# Patient Record
Sex: FEMALE | Race: WHITE
[De-identification: names, ages, dates, MRNs, and addresses within clinical notes are randomized per-mention and may not be internally consistent; named-entity substitution may affect disease eponyms.]

---

## 2018-02-22 ENCOUNTER — HOSPITAL ENCOUNTER (OUTPATIENT)
Dept: HOSPITAL 31 - C.ENDO | Age: 68
Discharge: HOME | End: 2018-02-22
Attending: SPECIALIST
Payer: MEDICARE

## 2018-02-22 VITALS — BODY MASS INDEX: 31.8 KG/M2

## 2018-02-22 VITALS — DIASTOLIC BLOOD PRESSURE: 68 MMHG | HEART RATE: 92 BPM | SYSTOLIC BLOOD PRESSURE: 112 MMHG | RESPIRATION RATE: 14 BRPM

## 2018-02-22 VITALS — OXYGEN SATURATION: 100 % | TEMPERATURE: 97.1 F

## 2018-02-22 DIAGNOSIS — R10.84: ICD-10-CM

## 2018-02-22 DIAGNOSIS — I10: ICD-10-CM

## 2018-02-22 DIAGNOSIS — K64.8: ICD-10-CM

## 2018-02-22 DIAGNOSIS — E11.9: ICD-10-CM

## 2018-02-22 DIAGNOSIS — K64.4: ICD-10-CM

## 2018-02-22 DIAGNOSIS — J45.909: ICD-10-CM

## 2018-02-22 DIAGNOSIS — K58.9: ICD-10-CM

## 2018-02-22 DIAGNOSIS — R19.4: ICD-10-CM

## 2018-02-22 DIAGNOSIS — K62.5: Primary | ICD-10-CM

## 2018-02-22 PROCEDURE — 82948 REAGENT STRIP/BLOOD GLUCOSE: CPT

## 2018-02-22 PROCEDURE — 45380 COLONOSCOPY AND BIOPSY: CPT

## 2018-02-22 PROCEDURE — 88305 TISSUE EXAM BY PATHOLOGIST: CPT

## 2018-02-22 NOTE — CP.SDSHP
[facility] Day [facility]





- History


Proposed Procedure: COLONSCOPY


Pre-Op Diagnosis: SEE NOTES





- Previous Medical/Surgical History


Cardiac: Hypertension


Pulmonary: Asthma


Endocrine/Metabolic: Thyroid Disease, Diabetes, Other


Misc: Other





- Allergies


Allergies: 


Allergies





cefazolin Allergy (Mild, Verified 02/21/18 13:47)


 ITCHING


ibuprofen Allergy (Mild, Verified 02/21/18 13:47)


 ITCHING


pineapple Allergy (Mild, Verified 02/21/18 13:47)


 URTICARIA


zolpidem [From Ambien] Allergy (Verified 02/22/18 07:03)


 DIZZINESS


'RELAXER MEDS" Allergy (Uncoded 02/22/18 07:03)


 ITCHING











- Physical Exam


General Appearance: N


Vital Signs: 


 Vital Signs











  02/22/18





  06:50


 


Temperature 97.6 F


 


Pulse Rate 80


 


Respiratory 20





Rate 


 


Blood Pressure 131/79


 


O2 Sat by Pulse 97





Oximetry 











Mental Status: Alert & Oriented x3


Neuro: WNL


Heart: Other


Lungs: Other


GI: Other





- {Optional Preform as Required}


Breast: WNL


Abdomen: Other


Rectal: Other


Integument: WNL


: WNL


Ortho: Other


ENT: WNL





- Impression


Pt. Evaluated Today:Candidate for Anesthesia & Procedure: Yes





- Date & Time


Time: 08:02





Short Stay Discharge





- Short Stay Discharge


Admitting Diagnosis/Reason for Visit: RECTAL BLEEDING


Disposition: HOME/ ROUTINE

## 2018-11-24 ENCOUNTER — HOSPITAL ENCOUNTER (INPATIENT)
Dept: HOSPITAL 31 - C.ER | Age: 68
LOS: 6 days | Discharge: HOME | DRG: 202 | End: 2018-11-30
Attending: INTERNAL MEDICINE | Admitting: INTERNAL MEDICINE
Payer: MEDICARE

## 2018-11-24 VITALS — BODY MASS INDEX: 31.8 KG/M2

## 2018-11-24 DIAGNOSIS — Z79.4: ICD-10-CM

## 2018-11-24 DIAGNOSIS — J15.9: ICD-10-CM

## 2018-11-24 DIAGNOSIS — Z79.890: ICD-10-CM

## 2018-11-24 DIAGNOSIS — G62.9: ICD-10-CM

## 2018-11-24 DIAGNOSIS — F41.9: ICD-10-CM

## 2018-11-24 DIAGNOSIS — Z87.891: ICD-10-CM

## 2018-11-24 DIAGNOSIS — E03.9: ICD-10-CM

## 2018-11-24 DIAGNOSIS — J44.1: ICD-10-CM

## 2018-11-24 DIAGNOSIS — Z85.3: ICD-10-CM

## 2018-11-24 DIAGNOSIS — Z92.21: ICD-10-CM

## 2018-11-24 DIAGNOSIS — J45.901: Primary | ICD-10-CM

## 2018-11-24 DIAGNOSIS — I10: ICD-10-CM

## 2018-11-24 DIAGNOSIS — J20.9: ICD-10-CM

## 2018-11-24 DIAGNOSIS — E11.65: ICD-10-CM

## 2018-11-24 DIAGNOSIS — J44.0: ICD-10-CM

## 2018-11-24 DIAGNOSIS — E78.5: ICD-10-CM

## 2018-11-24 LAB
ALBUMIN SERPL-MCNC: 4.7 G/DL (ref 3.5–5)
ALBUMIN/GLOB SERPL: 1.1 {RATIO} (ref 1–2.1)
ALT SERPL-CCNC: 35 U/L (ref 9–52)
ARTERIAL BLOOD GAS O2 SAT: 97.7 % (ref 95–98)
ARTERIAL BLOOD GAS PCO2: 22 MM/HG (ref 35–45)
ARTERIAL BLOOD GAS TCO2: 22.8 MMOL/L (ref 22–28)
ARTERIAL PATENCY WRIST A: (no result)
AST SERPL-CCNC: 52 U/L (ref 14–36)
BASOPHILS # BLD AUTO: 0.1 K/UL (ref 0–0.2)
BASOPHILS NFR BLD: 0.7 % (ref 0–2)
BNP SERPL-MCNC: 43 PG/ML (ref 0–900)
BUN SERPL-MCNC: 16 MG/DL (ref 7–17)
CALCIUM SERPL-MCNC: 9.8 MG/DL (ref 8.6–10.4)
EOSINOPHIL # BLD AUTO: 0.3 K/UL (ref 0–0.7)
EOSINOPHIL NFR BLD: 2.5 % (ref 0–4)
ERYTHROCYTE [DISTWIDTH] IN BLOOD BY AUTOMATED COUNT: 14.3 % (ref 11.5–14.5)
GFR NON-AFRICAN AMERICAN: > 60
HCO3 BLDA-SCNC: 26.9 MMOL/L (ref 21–28)
HGB BLD-MCNC: 12.2 G/DL (ref 11–16)
INHALED O2 CONCENTRATION: 30 %
LYMPHOCYTES # BLD AUTO: 4 K/UL (ref 1–4.3)
LYMPHOCYTES NFR BLD AUTO: 33.6 % (ref 20–40)
MCH RBC QN AUTO: 31.2 PG (ref 27–31)
MCHC RBC AUTO-ENTMCNC: 33.8 G/DL (ref 33–37)
MCV RBC AUTO: 92.2 FL (ref 81–99)
MONOCYTES # BLD: 0.8 K/UL (ref 0–0.8)
MONOCYTES NFR BLD: 6.7 % (ref 0–10)
NEUTROPHILS # BLD: 6.7 K/UL (ref 1.8–7)
NEUTROPHILS NFR BLD AUTO: 56.5 % (ref 50–75)
NRBC BLD AUTO-RTO: 0.1 % (ref 0–2)
PH BLDA: 7.61 [PH] (ref 7.35–7.45)
PLATELET # BLD: 227 K/UL (ref 130–400)
PMV BLD AUTO: 10.4 FL (ref 7.2–11.7)
PO2 BLDA: 125 MM/HG (ref 80–100)
RBC # BLD AUTO: 3.92 MIL/UL (ref 3.8–5.2)
WBC # BLD AUTO: 11.9 K/UL (ref 4.8–10.8)

## 2018-11-24 PROCEDURE — 5A09557 ASSISTANCE WITH RESPIRATORY VENTILATION, GREATER THAN 96 CONSECUTIVE HOURS, CONTINUOUS POSITIVE AIRWAY PRESSURE: ICD-10-PCS | Performed by: INTERNAL MEDICINE

## 2018-11-24 RX ADMIN — METHYLPREDNISOLONE SODIUM SUCCINATE SCH MG: 40 INJECTION, POWDER, FOR SOLUTION INTRAMUSCULAR; INTRAVENOUS at 20:19

## 2018-11-24 NOTE — C.PDOC
History Of Present Illness


68 year old female with PMHx of asthma presents to the ED complaining of 

shortness of breath for 3 days that got worse today. Pt notes her SOB feels like

her previous asthma exacerbations. No previous intubations for asthma per pt. Pt

was sent in by PMD for further eval of SOB. Reports she had a fever of 99.3 

three days ago. Denies any chest pain, leg edema, chills, sweats, nausea, 

vomiting, lightheadedness or any other complaints.  


Chief Complaint (Nursing): Shortness Of Breath


History Per: Patient


History/Exam Limitations: no limitations


Onset/Duration Of Symptoms: Days (3)


Current Symptoms Are (Timing): Still Present


Associated Symptoms: Fever.  denies: Chills, Sweating, Chest Pain, Light-

headedness





Past Medical History


Reviewed: Historical Data, Nursing Documentation, Vital Signs


Vital Signs: 





                                Last Vital Signs











Temp  99 F   18 16:50


 


Pulse  112 H  18 16:50


 


Resp  24   18 16:50


 


BP  117/79   18 16:50


 


Pulse Ox  97   18 16:50














- Medical History


PMH: Asthma, Diabetes, Hypercholesterolemia, Hypothyroidism


   Denies: Chronic Kidney Disease


Surgical History: Appendectomy, Cholecystectomy





- [facility] Procedures











LARYGNOSCOPY AND OTH TRACHEOSCOPY (10/22/13)


VENOUS CATHETERIZATION NEC (10/22/13)








Family History: States: No Known Family Hx





- Social History


Hx Tobacco Use: No


Hx Alcohol Use: No


Hx Substance Use: No





- Immunization History


Hx Tetanus Toxoid Vaccination: No


Hx Influenza Vaccination: Yes ()


Hx Pneumococcal Vaccination: No





Review Of Systems


Constitutional: Positive for: Fever.  Negative for: Chills, Sweats


Eyes: Negative for: Pain


ENT: Negative for: Ear Pain, Mouth Swelling


Cardiovascular: Negative for: Chest Pain, Light Headedness


Respiratory: Positive for: Shortness of Breath


Gastrointestinal: Negative for: Nausea, Vomiting, Abdominal Pain, Diarrhea, C

onstipation, Melena, Hematochezia


Genitourinary: Negative for: Dysuria, Frequency, Hematuria, Pelvic Pain, Rash


Musculoskeletal: Negative for: Neck Pain


Skin: Negative for: Rash


Neurological: Negative for: Weakness, Numbness





Physical Exam





- Physical Exam


Appears: Non-toxic


Skin: Warm, Dry


Head: Normacephalic


Eye(s): bilateral: Normal Inspection, PERRL, EOMI


Nose: Normal


Oral Mucosa: Moist


Neck: Supple


Chest: Symmetrical


Cardiovascular: Rhythm Regular


Respiratory: No Rales, No Rhonchi, Wheezing (b/l)


Gastrointestinal/Abdominal: Normal Exam, Soft, No Tenderness


Back: Normal Inspection, No CVA Tenderness


Extremity: Normal ROM, No Pedal Edema, No Swelling


Extremity: Bilateral: Atraumatic, Normal Color And Temperature, Normal ROM


Neurological/Psych: Oriented x3, Normal Speech, Normal Cognition





ED Course And Treatment





- Laboratory Results


Result Diagrams: 


                                 18 18:56





                                 18 18:56


O2 Sat by Pulse Oximetry: 97 (NC)


Pulse Ox Interpretation: Normal





Critical Care Time





- Critical Care Note


Total Time (in mins): 30


Documented critical care: time excludes all time spent performing seperately 

billable procedures.





Medical Decision Making


Medical Decision Makin year old F w/ hx of asthma p/w sob. Multiple admission but no previous 

admissions for asthma. No fever, chills or night sweats. Dry cough. Sent in by 

PMD for further evaluation of SOB. Duonebs given, will continue w/ Mg and 

additional Nebs. 





Orders: 


- CXR


- Bloodwork


- Influenze AB


- Nebulizer treatment 


- Solumedrol 125mg IVP 








Pt noted to be speaking in 2 word sentences, with diffuse wheezes and tremulous.

She denies any hx of ETOH withdrawal or daily ETOH use. Placed on BIPAP with 

improvement. 


ICU consulted 


appreciate consultation w/ Dr. Piedra (ICU) to see pt








Seen by Dr. Piedra: ?withdrawal per ICU. Reccomending CT PE study, expanded 

history per ICU: pt has hx of CA, previously on eliquis and opiates. 


UDS requested by ICU








CT PE negative


abg- nonhypercapnic. 


seen by ICU bedside: likely withdrawal per icu given ABG and clinical hx of CA 

previously on opiates, no indication for ICU admission at this time


Wheezes resolved. lungs CTA b/l. 


Pt improved in NAD, off bipap, tolerting nebs well with good o2 sat, agreeable 

to plan. 








Disposition





- Disposition


Disposition: HOSPITALIZED


Disposition Time: 23:18


Condition: FAIR





- Clinical Impression


Clinical Impression: 


 Asthma








- Scribe Statement


The provider has reviewed the documentation as recorded by the Scribalessandra Cortes








All medical record entries made by the Scribe were at my direction and 

personally dictated by me. I have reviewed the chart and agree that the record 

accurately reflects my personal performance of the history, physical exam, 

medical decision making, and the department course for this patient. I have also

personally directed, reviewed, and agree with the discharge instructions and 

disposition.

## 2018-11-25 LAB
APTT BLD: 26 SECONDS (ref 21–34)
INR PPP: 1.2
PROTHROMBIN TIME: 13 SECONDS (ref 9.7–12.2)

## 2018-11-25 RX ADMIN — METHYLPREDNISOLONE SODIUM SUCCINATE SCH MG: 40 INJECTION, POWDER, FOR SOLUTION INTRAMUSCULAR; INTRAVENOUS at 04:18

## 2018-11-25 RX ADMIN — FLUTICASONE FUROATE AND VILANTEROL TRIFENATATE SCH PUFF: 100; 25 POWDER RESPIRATORY (INHALATION) at 09:32

## 2018-11-25 RX ADMIN — Medication SCH CAP: at 19:32

## 2018-11-25 RX ADMIN — INSULIN ASPART SCH UNITS: 100 INJECTION, SOLUTION INTRAVENOUS; SUBCUTANEOUS at 08:09

## 2018-11-25 RX ADMIN — IPRATROPIUM BROMIDE AND ALBUTEROL SULFATE SCH ML: .5; 3 SOLUTION RESPIRATORY (INHALATION) at 09:46

## 2018-11-25 RX ADMIN — METHYLPREDNISOLONE SODIUM SUCCINATE SCH MG: 40 INJECTION, POWDER, FOR SOLUTION INTRAMUSCULAR; INTRAVENOUS at 19:32

## 2018-11-25 RX ADMIN — INSULIN ASPART SCH UNITS: 100 INJECTION, SOLUTION INTRAVENOUS; SUBCUTANEOUS at 21:30

## 2018-11-25 RX ADMIN — INSULIN DETEMIR SCH UNITS: 100 INJECTION, SOLUTION SUBCUTANEOUS at 21:30

## 2018-11-25 RX ADMIN — INSULIN ASPART SCH UNITS: 100 INJECTION, SOLUTION INTRAVENOUS; SUBCUTANEOUS at 17:56

## 2018-11-25 RX ADMIN — IPRATROPIUM BROMIDE AND ALBUTEROL SULFATE SCH ML: .5; 3 SOLUTION RESPIRATORY (INHALATION) at 15:03

## 2018-11-25 RX ADMIN — ENOXAPARIN SODIUM SCH MG: 40 INJECTION SUBCUTANEOUS at 10:03

## 2018-11-25 RX ADMIN — PANTOPRAZOLE SODIUM SCH MG: 40 TABLET, DELAYED RELEASE ORAL at 15:02

## 2018-11-25 RX ADMIN — IPRATROPIUM BROMIDE AND ALBUTEROL SULFATE SCH ML: .5; 3 SOLUTION RESPIRATORY (INHALATION) at 01:26

## 2018-11-25 RX ADMIN — INSULIN DETEMIR SCH UNITS: 100 INJECTION, SOLUTION SUBCUTANEOUS at 00:44

## 2018-11-25 RX ADMIN — INSULIN ASPART SCH UNITS: 100 INJECTION, SOLUTION INTRAVENOUS; SUBCUTANEOUS at 12:11

## 2018-11-25 RX ADMIN — IPRATROPIUM BROMIDE AND ALBUTEROL SULFATE SCH ML: .5; 3 SOLUTION RESPIRATORY (INHALATION) at 20:21

## 2018-11-25 RX ADMIN — METHYLPREDNISOLONE SODIUM SUCCINATE SCH MG: 40 INJECTION, POWDER, FOR SOLUTION INTRAMUSCULAR; INTRAVENOUS at 12:18

## 2018-11-25 NOTE — CP.PCM.CON
History of Present Illness





- History of Present Illness


History of Present Illness: 





CHART REVIEWED. PT SEEN AND EXAMINED. 


69 YO HISP FEMALE WITH A HX COPD, HTN, DM, HYPERLIPIDEMIA, HYPOTH, +BREAST CA 

S/P CHEMO AND RT ?METS TO THYROID AND KIDNEY, ADM 11/24/18 WITH INCREASED MOD-

SEVERE SOB AT REST X 3 DAYS, SEEN AT [facility] [place] AND RELEASED ON AB, COMPLETED 

COURSE, NO BETTER.,  NO RELIEF WITH HOME NEB 4X/DAY. +COUGH NO SPUTUM., NO HOME 

O2. IN ER, PLACED ON BIPAP SUPPORT. 





Review of Systems





- Review of Systems


All systems: reviewed and no additional remarkable complaints except





- Constitutional


Constitutional: Weakness.  absent: Chills, Night Sweats





- EENT


Eyes: absent: Change in Vision


Nose/Mouth/Throat: absent: Nasal Congestion





- Respiratory


Respiratory: Cough, Dyspnea, Dyspnea on Exertion, Wheezing.  absent: Excessive 

Mucous Production





- Gastrointestinal


Gastrointestinal: absent: Nausea, Vomiting





- Genitourinary


Genitourinary: absent: Dysuria





- Musculoskeletal


Musculoskeletal: absent: Joint Swelling, Tingling





- Integumentary


Integumentary: absent: Lesions





- Neurological


Neurological: absent: Abnormal Hearing





- Psychiatric


Psychiatric: absent: Confusion





- Endocrine


Endocrine: absent: Change in Body Appearance





- Hematologic/Lymphatic


Hematologic: absent: Easy Bleeding





Past Patient History





- Past Medical History & Family History


Past Medical History?: Yes


Past Family History: Reviewed and not pertinent





- Past Social History


Smoking Status: Former Smoker


Alcohol: None





- CARDIAC


Hx Cardiac Disorders: Yes


Hx Hypercholesterolemia: Yes





- PULMONARY


Hx Respiratory Disorders: Yes


Hx Asthma: Yes





- NEUROLOGICAL


Hx Neurological Disorder: No





- HEENT


Hx HEENT Problems: No





- RENAL


Hx Chronic Kidney Disease: No





- ENDOCRINE/METABOLIC


Hx Endocrine Disorders: Yes


Hx Diabetes Mellitus Type 2: Yes


Hx Hypothyroidism: Yes





- HEMATOLOGICAL/ONCOLOGICAL


Hx Blood Disorders: Yes


Hx Cancer: Yes (BREAST, KIDNEY, THYROID)


Hx Chemotherapy: Yes


Hx Metastesis: Yes





- INTEGUMENTARY


Hx Dermatological Problems: No





- MUSCULOSKELETAL/RHEUMATOLOGICAL


Hx Musculoskeletal Disorders: No


Hx Falls: No





- GASTROINTESTINAL


Hx Gastrointestinal Disorders: Yes


Hx Gall Bladder Disease: Yes


Other/Comment: C/O ABDOMINAL PAIN





- GENITOURINARY/GYNECOLOGICAL


Hx Genitourinary Disorders: No





- PSYCHIATRIC


Hx Bipolar Disorder: No


Hx Substance Use: No





- SURGICAL HISTORY


Hx Surgeries: Yes


Hx Appendectomy: Yes


Hx Cholecystectomy: Yes


Hx Hysterectomy: Yes


Hx Mastectomy: Yes





- ANESTHESIA


Hx Anesthesia: Yes


Hx Anesthesia Reactions: No


Hx Malignant Hyperthermia: No





Meds


Allergies/Adverse Reactions: 


                                    Allergies











Allergy/AdvReac Type Severity Reaction Status Date / Time


 


cefazolin Allergy Mild ITCHING Verified 11/24/18 16:54


 


ibuprofen Allergy Mild ITCHING Verified 11/24/18 16:54


 


pineapple Allergy Mild URTICARIA Verified 11/24/18 16:54


 


zolpidem [From Ambien] Allergy  DIZZINESS Verified 11/24/18 16:54


 


'RELAXER MEDS" Allergy  ITCHING Uncoded 11/24/18 16:54














- Medications


Medications: 


                               Current Medications





Albuterol/Ipratropium (Duoneb 3 Mg/0.5 Mg (3 Ml) Ud)  3 ml INH RQ6 [facility]


   Last Admin: 11/25/18 15:03 Dose:  3 ml


Alprazolam (Xanax)  1 mg PO DAILY PRN


   PRN Reason: Anxiety


Aspirin (Ecotrin)  81 mg PO DAILY [facility]


   Last Admin: 11/25/18 10:03 Dose:  81 mg


Cyanocobalamin (Vitamin B12 1000 Mcg Tab)  2,000 mcg PO DAILY [facility]


Dextrose (Dextrose 50% Inj)  0 ml IV STAT PRN; Protocol


   PRN Reason: Hypoglycemia Protocol


Dextrose (Glutose 15)  0 gm PO ONCE PRN; Protocol


   PRN Reason: Hypoglycemia Protocol


Enoxaparin Sodium (Lovenox)  40 mg SC DAILY [facility]


   Last Admin: 11/25/18 10:03 Dose:  40 mg


Ergocalciferol (Drisdol 50,000 Intl Units Cap)  1 cap PO QWK [facility]


Fenofibrate (Tricor)  48 mg PO DAILY [facility]


Fluticasone/Vilanterol (Breo Ellipta 100-25 Mcg Inh)  1 puff INH RQD [facility]


   Last Admin: 11/25/18 09:32 Dose:  1 puff


Gabapentin (Neurontin)  800 mg PO TID PRN


   PRN Reason: to give with dilaudid


   Last Admin: 11/25/18 13:35 Dose:  800 mg


Glucagon (Glucagen Diagnostic Kit)  0 mg IM STAT PRN; Protocol


   PRN Reason: Hypoglycemia Protocol


Hydromorphone HCl (Dilaudid)  2 mg PO Q8 PRN


   PRN Reason: pain


   Last Admin: 11/25/18 13:35 Dose:  2 mg


Azithromycin 500 mg/ Sodium (Chloride)  250 mls @ 250 mls/hr IVPB DAILY [facility]; 

Protocol


   Last Admin: 11/25/18 10:04 Dose:  250 mls/hr


Dextrose (Dextrose 5% In Water 1000 Ml)  1,000 mls @ 0 mls/hr IV .Q0M PRN; 

Protocol


   PRN Reason: Hypoglycemia Protocol


Insulin Aspart (Novolog)  0 unit SC ACHS [facility]; Protocol


   Last Admin: 11/25/18 17:56 Dose:  5 units


Insulin Detemir (Levemir)  20 unit SC [facility]


   Last Admin: 11/25/18 00:44 Dose:  20 units


Lactobacillus Acidophilus (Bacid Acidophilus)  1 cap PO BID [facility]


Levothyroxine Sodium (Synthroid)  100 mcg PO DAILY@0630 [facility]


Lidocaine (Lidoderm)  1 ea TD DAILY PRN


   PRN Reason: pain


Methylprednisolone (Solu-Medrol)  40 mg IVP Q8H [facility]


   Stop: 11/27/18 19:31


   Last Admin: 11/25/18 12:18 Dose:  40 mg


Montelukast Sodium (Singulair)  10 mg PO [facility]


   Last Admin: 11/24/18 21:54 Dose:  10 mg


Omega-3-Acid Ethyl Esters (Lovaza)  1 gm PO DAILY [facility]


Pantoprazole Sodium (Protonix Ec Tab)  40 mg PO DAILY [facility]


   Last Admin: 11/25/18 15:02 Dose:  40 mg


Pneumococcal Polyvalent Vaccine (Pneumovax 23 Vaccine)  0.5 ml IM .ONCE ONE


   Stop: 11/27/18 12:01


Repaglinide (Prandin)  1 mg PO TID [facility]


   Last Admin: 11/25/18 18:10 Dose:  1 mg


Rosuvastatin Calcium (Crestor)  20 mg PO [facility]


Sucralfate (Carafate Tab)  1 gm PO QID [facility]


   Last Admin: 11/25/18 17:56 Dose:  1 gm











Physical Exam





- Constitutional


Appears: No Acute Distress





- Head Exam


Head Exam: ATRAUMATIC, NORMOCEPHALIC





- Eye Exam


Eye Exam: EOMI, Normal appearance





- ENT Exam


ENT Exam: Mucous Membranes Moist





- Neck Exam


Neck exam: Positive for: Normal Inspection





- Respiratory Exam


Respiratory Exam: Decreased Breath Sounds.  absent: Accessory Muscle Use, 

Wheezes





- Cardiovascular Exam


Cardiovascular Exam: RRR, +S1, +S2





- GI/Abdominal Exam


GI & Abdominal Exam: Soft.  absent: Tenderness





- Rectal Exam


Rectal Exam: Deferred





- Extremities Exam


Extremities exam: Negative for: calf tenderness, pedal edema





- Back Exam


Back exam: absent: CVA tenderness (L), CVA tenderness (R)





- Neurological Exam


Neurological exam: Alert, CN II-XII Intact, Oriented x3





- Psychiatric Exam


Psychiatric exam: Normal Mood





Results





- Vital Signs


Recent Vital Signs: 


                                Last Vital Signs











Temp  98.2 F   11/25/18 17:23


 


Pulse  90   11/25/18 17:23


 


Resp  20   11/25/18 17:23


 


BP  120/78   11/25/18 17:23


 


Pulse Ox  96   11/25/18 17:23














- Labs


Result Diagrams: 


                                 11/24/18 18:56





                                 11/24/18 18:56


Labs: 


                         Laboratory Results - last 24 hr











  11/24/18 11/24/18 11/24/18





  18:18 18:56 19:00


 


PT   


 


INR   


 


APTT   


 


Puncture Site    Rra


 


pCO2    22 L


 


pO2    125 H


 


HCO3    26.9


 


ABG pH    7.61 H*


 


ABG Total CO2    22.8


 


ABG O2 Saturation    97.7


 


ABG Base Excess    2.4


 


Jenaro Test    Pos


 


ABG Potassium    2.8 L


 


A-a O2 Difference    61.0


 


Respiratory Index    0.5


 


Glucose    217 H


 


Lactate    3.6 H


 


FiO2    30.0


 


Inspiratory BiPAP    10


 


Expiratory BiPAP    5


 


Crit Value Called To    Dr asif


 


Crit Value Called By    [facility]


 


Crit Value Read Back    Y


 


Blood Gas Notified Time    1909


 


Sodium   140  143.0


 


Potassium   4.3 


 


Chloride   99  107.0


 


Carbon Dioxide   22 


 


Anion Gap   23 H 


 


BUN   16 


 


Creatinine   0.7 


 


Est GFR ( Amer)   > 60 


 


Est GFR (Non-Af Amer)   > 60 


 


POC Glucose (mg/dL)   


 


Random Glucose   155 H 


 


Calcium   9.8 


 


Total Bilirubin   0.8 


 


AST   52 H 


 


ALT   35 


 


Alkaline Phosphatase   77 


 


Troponin I   


 


NT-Pro-B Natriuret Pep   


 


Total Protein   8.9 H 


 


Albumin   4.7 


 


Globulin   4.2 H 


 


Albumin/Globulin Ratio   1.1 


 


Procalcitonin   


 


Arterial Blood Potassium    2.8 L


 


Urine Opiates Screen   


 


Urine Methadone Screen   


 


Ur Barbiturates Screen   


 


Ur Phencyclidine Scrn   


 


Ur Amphetamines Screen   


 


U Benzodiazepines Scrn   


 


U Oth Cocaine Metabols   


 


U Cannabinoids Screen   


 


Influenza Typ A,B (EIA)  Negative for flu a/b  














  11/24/18 11/24/18 11/24/18





  19:34 19:38 22:12


 


PT   


 


INR   


 


APTT   


 


Puncture Site   


 


pCO2   


 


pO2   


 


HCO3   


 


ABG pH   


 


ABG Total CO2   


 


ABG O2 Saturation   


 


ABG Base Excess   


 


Jenaro Test   


 


ABG Potassium   


 


A-a O2 Difference   


 


Respiratory Index   


 


Glucose   


 


Lactate   


 


FiO2   


 


Inspiratory BiPAP   


 


Expiratory BiPAP   


 


Crit Value Called To   


 


Crit Value Called By   


 


Crit Value Read Back   


 


Blood Gas Notified Time   


 


Sodium   


 


Potassium   


 


Chloride   


 


Carbon Dioxide   


 


Anion Gap   


 


BUN   


 


Creatinine   


 


Est GFR ( Amer)   


 


Est GFR (Non-Af Amer)   


 


POC Glucose (mg/dL)   


 


Random Glucose   


 


Calcium   


 


Total Bilirubin   


 


AST   


 


ALT   


 


Alkaline Phosphatase   


 


Troponin I   < 0.0120 


 


NT-Pro-B Natriuret Pep   43.0 


 


Total Protein   


 


Albumin   


 


Globulin   


 


Albumin/Globulin Ratio   


 


Procalcitonin    < 0.05 L


 


Arterial Blood Potassium   


 


Urine Opiates Screen  Negative  


 


Urine Methadone Screen  Negative  


 


Ur Barbiturates Screen  Negative  


 


Ur Phencyclidine Scrn  Negative  


 


Ur Amphetamines Screen  Negative  


 


U Benzodiazepines Scrn  Negative  


 


U Oth Cocaine Metabols  Negative  


 


U Cannabinoids Screen  Negative  


 


Influenza Typ A,B (EIA)   














  11/24/18 11/25/18





  23:11 07:26


 


PT   13.0 H


 


INR   1.2


 


APTT   26


 


Puncture Site  


 


pCO2  


 


pO2  


 


HCO3  


 


ABG pH  


 


ABG Total CO2  


 


ABG O2 Saturation  


 


ABG Base Excess  


 


Jenaro Test  


 


ABG Potassium  


 


A-a O2 Difference  


 


Respiratory Index  


 


Glucose  


 


Lactate  


 


FiO2  


 


Inspiratory BiPAP  


 


Expiratory BiPAP  


 


Crit Value Called To  


 


Crit Value Called By  


 


Crit Value Read Back  


 


Blood Gas Notified Time  


 


Sodium  


 


Potassium  


 


Chloride  


 


Carbon Dioxide  


 


Anion Gap  


 


BUN  


 


Creatinine  


 


Est GFR ( Amer)  


 


Est GFR (Non-Af Amer)  


 


POC Glucose (mg/dL)  361 H 


 


Random Glucose  


 


Calcium  


 


Total Bilirubin  


 


AST  


 


ALT  


 


Alkaline Phosphatase  


 


Troponin I  


 


NT-Pro-B Natriuret Pep  


 


Total Protein  


 


Albumin  


 


Globulin  


 


Albumin/Globulin Ratio  


 


Procalcitonin  


 


Arterial Blood Potassium  


 


Urine Opiates Screen  


 


Urine Methadone Screen  


 


Ur Barbiturates Screen  


 


Ur Phencyclidine Scrn  


 


Ur Amphetamines Screen  


 


U Benzodiazepines Scrn  


 


U Oth Cocaine Metabols  


 


U Cannabinoids Screen  


 


Influenza Typ A,B (EIA)  














Assessment & Plan


(1) COPD exacerbation


Status: Acute   





(2) Diabetes


Status: Acute   





(3) Breast cancer


Status: Acute   





(4) Hyperlipidemia


Status: Acute   





(5) Hypothyroid


Status: Acute   





(6) HTN (hypertension)


Status: Acute   





- Assessment and Plan (Free Text)


Assessment: 





69 YO FEMALE WITH A HX MULT MED PROBS ADM WITH COPD EXAC +SEVERE BRONCHITIS., CX

R REVIEWED. CTA REVIEWED, NEG PE. CONT NEB BD., MONITOR O2 SAT. ABG NOTED. 

IMPROVING ON HUMIDIFIED AIR NOW. STEROID TAPER AS TOLERATED. CARDIO W/U IN 

PROGRESS. ?UNDERLYING METASTATIC CA. GI/DVT PROPHYLAXIS. PROG GUARDED. DISCUSSED

WITH STAFF AT LENGTH AND FAMILY AT BEDSIDE.

## 2018-11-25 NOTE — CP.PCM.HP
Present on Admission





- Present on Admission


Any Indicators Present on Admission: No





Past Patient History





- Past Medical History & Family History


Past Medical History?: Yes





- Past Social History


Smoking Status: Former Smoker





- CARDIAC


Hx Cardiac Disorders: Yes


Hx Hypercholesterolemia: Yes





- PULMONARY


Hx Respiratory Disorders: Yes


Hx Asthma: Yes





- NEUROLOGICAL


Hx Neurological Disorder: No





- HEENT


Hx HEENT Problems: No





- RENAL


Hx Chronic Kidney Disease: No





- ENDOCRINE/METABOLIC


Hx Endocrine Disorders: Yes


Hx Hypothyroidism: Yes





- HEMATOLOGICAL/ONCOLOGICAL


Hx Blood Disorders: Yes


Hx Cancer: Yes (BREAST, KIDNEY, THYROID)


Hx Chemotherapy: Yes


Hx Metastesis: Yes





- INTEGUMENTARY


Hx Dermatological Problems: No





- MUSCULOSKELETAL/RHEUMATOLOGICAL


Hx Musculoskeletal Disorders: No


Hx Falls: No





- GASTROINTESTINAL


Hx Gastrointestinal Disorders: Yes


Other/Comment: C/O ABDOMINAL PAIN





- GENITOURINARY/GYNECOLOGICAL


Hx Genitourinary Disorders: No





- PSYCHIATRIC


Hx Bipolar Disorder: No


Hx Substance Use: No





- SURGICAL HISTORY


Hx Surgeries: Yes


Hx Appendectomy: Yes


Hx Cholecystectomy: Yes





- ANESTHESIA


Hx Anesthesia: Yes


Hx Anesthesia Reactions: No


Hx Malignant Hyperthermia: No





Meds


Allergies/Adverse Reactions: 


                                    Allergies











Allergy/AdvReac Type Severity Reaction Status Date / Time


 


cefazolin Allergy Mild ITCHING Verified 11/24/18 16:54


 


ibuprofen Allergy Mild ITCHING Verified 11/24/18 16:54


 


pineapple Allergy Mild URTICARIA Verified 11/24/18 16:54


 


zolpidem [From Ambien] Allergy  DIZZINESS Verified 11/24/18 16:54


 


'RELAXER MEDS" Allergy  ITCHING Uncoded 11/24/18 16:54














Physical Exam





- Constitutional


Appears: Well





- Head Exam


Head Exam: ATRAUMATIC, NORMAL INSPECTION, NORMOCEPHALIC





- Eye Exam


Eye Exam: EOMI, Normal appearance, PERRL


Pupil Exam: NORMAL ACCOMODATION, PERRL





- ENT Exam


ENT Exam: Mucous Membranes Moist, Normal Exam





- Neck Exam


Neck exam: Positive for: Normal Inspection





- Respiratory Exam


Respiratory Exam: Decreased Breath Sounds





- Cardiovascular Exam


Cardiovascular Exam: REGULAR RHYTHM, +S1, +S2





- GI/Abdominal Exam


GI & Abdominal Exam: Diminished Bowel Sounds, Soft





- Rectal Exam


Rectal Exam: Deferred





Results





- Vital Signs


Recent Vital Signs: 





                                Last Vital Signs











Temp  98.2 F   11/25/18 17:23


 


Pulse  90   11/25/18 17:23


 


Resp  20   11/25/18 17:23


 


BP  120/78   11/25/18 17:23


 


Pulse Ox  96   11/25/18 17:23














- Labs


Result Diagrams: 


                                 11/29/18 06:44





                                 11/29/18 06:44


Labs: 





                         Laboratory Results - last 24 hr











  11/24/18 11/24/18 11/24/18





  18:18 18:56 18:56


 


WBC   11.9 H 


 


RBC   3.92 


 


Hgb   12.2 


 


Hct   36.2 


 


MCV   92.2 


 


MCH   31.2 H 


 


MCHC   33.8 


 


RDW   14.3 


 


Plt Count   227 


 


MPV   10.4 


 


Neut % (Auto)   56.5 


 


Lymph % (Auto)   33.6 


 


Mono % (Auto)   6.7 


 


Eos % (Auto)   2.5 


 


Baso % (Auto)   0.7 


 


Neut # (Auto)   6.7 


 


Lymph # (Auto)   4.0 


 


Mono # (Auto)   0.8 


 


Eos # (Auto)   0.3 


 


Baso # (Auto)   0.1 


 


PT   


 


INR   


 


APTT   


 


Puncture Site   


 


pCO2   


 


pO2   


 


HCO3   


 


ABG pH   


 


ABG Total CO2   


 


ABG O2 Saturation   


 


ABG Base Excess   


 


Jenaro Test   


 


ABG Potassium   


 


A-a O2 Difference   


 


Respiratory Index   


 


Glucose   


 


Lactate   


 


FiO2   


 


Inspiratory BiPAP   


 


Expiratory BiPAP   


 


Crit Value Called To   


 


Crit Value Called By   


 


Crit Value Read Back   


 


Blood Gas Notified Time   


 


Sodium    140


 


Potassium    4.3


 


Chloride    99


 


Carbon Dioxide    22


 


Anion Gap    23 H


 


BUN    16


 


Creatinine    0.7


 


Est GFR ( Amer)    > 60


 


Est GFR (Non-Af Amer)    > 60


 


POC Glucose (mg/dL)   


 


Random Glucose    155 H


 


Calcium    9.8


 


Total Bilirubin    0.8


 


AST    52 H


 


ALT    35


 


Alkaline Phosphatase    77


 


Troponin I   


 


NT-Pro-B Natriuret Pep   


 


Total Protein    8.9 H


 


Albumin    4.7


 


Globulin    4.2 H


 


Albumin/Globulin Ratio    1.1


 


Procalcitonin   


 


Arterial Blood Potassium   


 


Urine Opiates Screen   


 


Urine Methadone Screen   


 


Ur Barbiturates Screen   


 


Ur Phencyclidine Scrn   


 


Ur Amphetamines Screen   


 


U Benzodiazepines Scrn   


 


U Oth Cocaine Metabols   


 


U Cannabinoids Screen   


 


Influenza Typ A,B (EIA)  Negative for flu a/b  














  11/24/18 11/24/18 11/24/18





  19:00 19:34 19:38


 


WBC   


 


RBC   


 


Hgb   


 


Hct   


 


MCV   


 


MCH   


 


MCHC   


 


RDW   


 


Plt Count   


 


MPV   


 


Neut % (Auto)   


 


Lymph % (Auto)   


 


Mono % (Auto)   


 


Eos % (Auto)   


 


Baso % (Auto)   


 


Neut # (Auto)   


 


Lymph # (Auto)   


 


Mono # (Auto)   


 


Eos # (Auto)   


 


Baso # (Auto)   


 


PT   


 


INR   


 


APTT   


 


Puncture Site  Rra  


 


pCO2  22 L  


 


pO2  125 H  


 


HCO3  26.9  


 


ABG pH  7.61 H*  


 


ABG Total CO2  22.8  


 


ABG O2 Saturation  97.7  


 


ABG Base Excess  2.4  


 


Jenaro Test  Pos  


 


ABG Potassium  2.8 L  


 


A-a O2 Difference  61.0  


 


Respiratory Index  0.5  


 


Glucose  217 H  


 


Lactate  3.6 H  


 


FiO2  30.0  


 


Inspiratory BiPAP  10  


 


Expiratory BiPAP  5  


 


Crit Value Called To  Dr asif  


 


Crit Value Called By  [facility]  


 


Crit Value Read Back  Y  


 


Blood Gas Notified Time  1909  


 


Sodium  143.0  


 


Potassium   


 


Chloride  107.0  


 


Carbon Dioxide   


 


Anion Gap   


 


BUN   


 


Creatinine   


 


Est GFR ( Amer)   


 


Est GFR (Non-Af Amer)   


 


POC Glucose (mg/dL)   


 


Random Glucose   


 


Calcium   


 


Total Bilirubin   


 


AST   


 


ALT   


 


Alkaline Phosphatase   


 


Troponin I    < 0.0120


 


NT-Pro-B Natriuret Pep    43.0


 


Total Protein   


 


Albumin   


 


Globulin   


 


Albumin/Globulin Ratio   


 


Procalcitonin   


 


Arterial Blood Potassium  2.8 L  


 


Urine Opiates Screen   Negative 


 


Urine Methadone Screen   Negative 


 


Ur Barbiturates Screen   Negative 


 


Ur Phencyclidine Scrn   Negative 


 


Ur Amphetamines Screen   Negative 


 


U Benzodiazepines Scrn   Negative 


 


U Oth Cocaine Metabols   Negative 


 


U Cannabinoids Screen   Negative 


 


Influenza Typ A,B (EIA)   














  11/24/18 11/24/18 11/25/18





  22:12 23:11 07:26


 


WBC   


 


RBC   


 


Hgb   


 


Hct   


 


MCV   


 


MCH   


 


MCHC   


 


RDW   


 


Plt Count   


 


MPV   


 


Neut % (Auto)   


 


Lymph % (Auto)   


 


Mono % (Auto)   


 


Eos % (Auto)   


 


Baso % (Auto)   


 


Neut # (Auto)   


 


Lymph # (Auto)   


 


Mono # (Auto)   


 


Eos # (Auto)   


 


Baso # (Auto)   


 


PT    13.0 H


 


INR    1.2


 


APTT    26


 


Puncture Site   


 


pCO2   


 


pO2   


 


HCO3   


 


ABG pH   


 


ABG Total CO2   


 


ABG O2 Saturation   


 


ABG Base Excess   


 


Jenaro Test   


 


ABG Potassium   


 


A-a O2 Difference   


 


Respiratory Index   


 


Glucose   


 


Lactate   


 


FiO2   


 


Inspiratory BiPAP   


 


Expiratory BiPAP   


 


Crit Value Called To   


 


Crit Value Called By   


 


Crit Value Read Back   


 


Blood Gas Notified Time   


 


Sodium   


 


Potassium   


 


Chloride   


 


Carbon Dioxide   


 


Anion Gap   


 


BUN   


 


Creatinine   


 


Est GFR ( Amer)   


 


Est GFR (Non-Af Amer)   


 


POC Glucose (mg/dL)   361 H 


 


Random Glucose   


 


Calcium   


 


Total Bilirubin   


 


AST   


 


ALT   


 


Alkaline Phosphatase   


 


Troponin I   


 


NT-Pro-B Natriuret Pep   


 


Total Protein   


 


Albumin   


 


Globulin   


 


Albumin/Globulin Ratio   


 


Procalcitonin  < 0.05 L  


 


Arterial Blood Potassium   


 


Urine Opiates Screen   


 


Urine Methadone Screen   


 


Ur Barbiturates Screen   


 


Ur Phencyclidine Scrn   


 


Ur Amphetamines Screen   


 


U Benzodiazepines Scrn   


 


U Oth Cocaine Metabols   


 


U Cannabinoids Screen   


 


Influenza Typ A,B (EIA)   














Assessment & Plan





- Assessment and Plan (Free Text)


Plan: 





Still having a lot of cough patient is on IV Avelox


IV steroid


DuoNeb


Singulair


Alprazolam


Pain medication


All medication as advised


CBC CMP


Follow-up with PMD

## 2018-11-25 NOTE — CT
Date of service: 



11/24/2018



PROCEDURE:  CT Chest with contrast (Pulmonary Angiogram)



HISTORY:

hx of ca p/w sob



COMPARISON:

10/22/2013



TECHNIQUE:

Axial computed tomography images were obtained of the chest in the 

pulmonary arterial phase of enhancement. Coronal and sagittal 

reformatted images were created and reviewed.



Intravenous contrast dose: 100 mL Omnipaque 350



Radiation dose:



Total exam DLP = 453.9 mGy-cm.



This CT exam was performed using one or more of the following dose 

reduction techniques: Automated exposure control, adjustment of the 

mA and/or kV according to patient size, and/or use of iterative 

reconstruction technique.



FINDINGS:



PULMONARY ARTERIES:

Evaluation technically limited due to timing of scan relative to 

contrast bolus.  Suboptimal evaluation of subsegmental pulmonary 

artery branches.  No evidence of pulmonary embolism in the main, 

lobar and segmental branches. 



AORTA:

No acute findings. No thoracic aortic aneurysm. No aortic 

atherosclerotic calcification or mural plaque present.



LUNGS:

Bilateral lower lobe linear scar/atelectasis.  No pulmonary 

infiltrate. 



PLEURAL SPACES:

Unremarkable. No effusion or pneumothorax. 



HEART:

Unremarkable. No cardiomegaly. No significant pericardial effusion. 



LYMPH NODES:

No lymphadenopathy.



BONES, CHEST WALL:

Right breast augmentation prosthesis.  Left mastectomy.  Surgical 

clips in left axilla.  No osseous fracture.



OTHER FINDINGS:

Unremarkable. 



IMPRESSION:

No evidence of pulmonary embolism.  Limited evaluation as above.  No 

pulmonary infiltrate.  Left mastectomy.  Right breast augmentation 

prosthesis. 



The preliminary findings for this examination were reported by [facility] at 11:05 p.m. on 11/24/2018..  There is concurrence of this 

report with the preliminary findings.

## 2018-11-25 NOTE — CP.PCM.CON
History of Present Illness





- History of Present Illness


History of Present Illness: 





I was asked to evaluate patient by Dr EDILBERTO Piedra  Patient seen 11/25/18 8908





Patient is a 68 year old female with HTN hyperchoelsterolemia DM, who presents 

with dyspnea. The symptoms have been present for the last 3 days.  She had 

associated cough.  Given DM there was a concern for a cardiac cause of her 

symptoms.





Review of Systems





- Constitutional


Constitutional: absent: As Per HPI, Anorexia, Chills, Daytime Sleepiness, 

Excessive Sweating, Fatigue, Fever, Frequent Falls, Headache, Increased 

Appetite, Lethargy, Malaise, Night Sweats, Snoring, Sleep Apnea, Weight Gain, 

Weight Loss, Weakness, Other





- EENT


Eyes: absent: As Per HPI, Blind Spots, Blurred Vision, Change in Vision, 

Decreased Night Vision, Diplopia, Discharge, Dry Eye, Exophthalmos, Floaters, 

Irritation, Itchy Eyes, Loss of Peripheral Vision, Pain, Photophobia, Requires 

Corrective Lenses, Sees Flashes, Spots in Vision, Tunnel Vision, Other Visual 

Disturbances, Loss of Vision, Other


Ears: absent: As Per HPI, Decreased Hearing, Ear Discharge, Ear Pain, Tinnitus, 

Abnormal Hearing, Disequilibrium, Dizziness, Other


Nose/Mouth/Throat: absent: As Per HPI, Epistaxis, Nasal Congestion, Nasal 

Discharge, Nasal Obstruction, Nasal Trauma, Nose Pain, Post Nasal Drip, Sinus 

Pain, Sinus Pressure, Bleeding Gums, Change in Voice, Dental Pain, Dry Mouth, 

Dysphagia, Halitosis, Hoarsness, Lip Swelling, Mouth Lesions, Mouth Pain, 

Odynophagia, Sore Throat, Throat Swelling, Tongue Swelling, Facial Pain, Neck 

Pain, Neck Mass, Other





- Cardiovascular


Cardiovascular: absent: As Per HPI, Acrocyanosis, Chest Pain, Chest Pain at 

Rest, Chest Pain with Activity, Claudication, Diaphoresis, Dyspnea, Dyspnea on 

Exertion, Edema, Irregular Heart Rhythm, Pain Radiating to Arm/Neck/Jaw, Leg Landon

ma, Leg Ulcers, Lightheadedness, Orthopnea, Palpitations, Paroxysmal Nocturnal 

Dyspnea, Pedal Edema, Radiating Pain, Rapid Heart Rate, Slow Heart Rate, 

Syncope, Other





- Respiratory


Respiratory: Cough, Dyspnea





- Gastrointestinal


Gastrointestinal: absent: As Per HPI, Abdominal Pain, Belching, Bloating, Change

in Bowel Habits, Change in Stool Character, Coffee Ground Emesis, Constipation, 

Cramping, Diarrhea, Dyspepsia, Dysphagia, Early Satiety, Excessive Flatus, Fecal

Incontinence, Heartburn, Hematemesis, Hematochezia, Loose Stools, Melena, 

Nausea, Odynophagia, Temesmus, Vomiting, Other





- Genitourinary


Genitourinary: absent: As Per HPI, Change in Urinary Stream, Difficulty Urin

ating, Dysuria, Flank Pain, Hematuria, Pyuria, Nocturia, Urinary Incontinence, 

Urinary Frequency, Urinary Hesitance, Urinary Urgency, Voiding Freq/Small Amts, 

Freq UTI, Hx Renal/Bladder Calculi, Hx /Renal Surgery, Bladder Distension, 

Other





- Musculoskeletal


Musculoskeletal: absent: As Per HPI, Abnormal Gait, Arthralgias, Atrophy, Back 

Pain, Deformity, Joint Swelling, Limited Range of Motion, Loss of Height, Muscle

Cramps, Muscle Weakness, Myalgias, Neck Pain, Numbness, Radiating Pain into 

Limb, Stiffness, Tingling, Other





- Integumentary


Integumentary: absent: As Per HPI, Acne, Alopecia, Bleeding Lesions, Change in 

Hair, Change in Nails, Change in Pigmentation, Changing Lesions, Dry Skin, 

Erythema, Furuncle, Hirsutism, Lesions, New Lesions, Non-Healing Lesions, 

Photosensitivity, Pruritus, Rash, Skin Pain, Skin Ulcer, Sores, Striae, 

Swelling, Unusual Bruising, Wounds, Jaundice, Other





- Neurological


Neurological: absent: As Per HPI, Abnormal Gait, Abnormal Hearing, Abnormal 

Movements, Abnormal Speech, Behavioral Changes, Burning Sensations, Confusion, 

Convulsions, Disequilibrium, Dizziness, Numbness, Focal Weakness, Frequent 

Falls, Headaches, Lack of Coordination, Loss of Vision, Memory Loss, 

Paresthesias, Radicular Pain, Restless Legs, Sensory Deficit, Syncope, Tingling,

Tremor, Vertigo, Weakness, Other Visual Disturbances, Other





- Psychiatric


Psychiatric: absent: As Per HPI, Abnormal Sleep Pattern, Anhedonia, Anxiety, A

uditory Hallucinations, Behavioral Changes, Change in Appetite, Change in 

Libido, Confusion, Depression, Difficulty Concentrating, Hallucinations, 

Homicidal Ideation, Hopelessness, Irritability, Memory Loss, Mood Swings, Panic 

Attacks, Paranoia, Suicidal Ideation, Visual Hallucinations, Tactile Isaacs

ucinations, Other





- Endocrine


Endocrine: absent: As Per HPI, Change in Body Appearance, Change in Libido, Cold

Intolorance, Deepening of Voice, Excessive Sweating, Fatigue, Flushing, Heat 

Intolorance, Increase in Ring/Shoe/Hat Size, Palpitations, Polydipsia, Po

lyphagia, Polyuria, Other





- Hematologic/Lymphatic


Hematologic: absent: As Per HPI, Easy Bleeding, Easy Bruising, Lymphadenopathy, 

Other





Past Patient History





- Past Medical History & Family History


Past Medical History?: Yes





- Past Social History


Smoking Status: Never Smoked





- CARDIAC


Hx Hypercholesterolemia: Yes





- PULMONARY


Hx Asthma: Yes





- NEUROLOGICAL


Hx Neurological Disorder: No





- HEENT


Hx HEENT Problems: No





- RENAL


Hx Chronic Kidney Disease: No





- ENDOCRINE/METABOLIC


Hx Hypothyroidism: Yes





- HEMATOLOGICAL/ONCOLOGICAL


Hx Blood Disorders: Yes


Hx Cancer: Yes (BREAST, KIDNEY, THYROID)


Hx Chemotherapy: Yes


Hx Metastesis: Yes





- INTEGUMENTARY


Hx Dermatological Problems: No





- MUSCULOSKELETAL/RHEUMATOLOGICAL


Hx Musculoskeletal Disorders: No





- GASTROINTESTINAL


Hx Gastrointestinal Disorders: Yes


Other/Comment: C/O ABDOMINAL PAIN





- GENITOURINARY/GYNECOLOGICAL


Hx Genitourinary Disorders: No





- PSYCHIATRIC


Hx Substance Use: No





- SURGICAL HISTORY


Hx Appendectomy: Yes


Hx Cholecystectomy: Yes





- ANESTHESIA


Hx Anesthesia: Yes


Hx Anesthesia Reactions: No


Hx Malignant Hyperthermia: No





Meds


Allergies/Adverse Reactions: 


                                    Allergies











Allergy/AdvReac Type Severity Reaction Status Date / Time


 


cefazolin Allergy Mild ITCHING Verified 11/24/18 16:54


 


ibuprofen Allergy Mild ITCHING Verified 11/24/18 16:54


 


pineapple Allergy Mild URTICARIA Verified 11/24/18 16:54


 


zolpidem [From Ambien] Allergy  DIZZINESS Verified 11/24/18 16:54


 


'RELAXER MEDS" Allergy  ITCHING Uncoded 11/24/18 16:54














- Medications


Medications: 


                               Current Medications





Albuterol/Ipratropium (Duoneb 3 Mg/0.5 Mg (3 Ml) Ud)  3 ml INH RQ6 Pending sale to [facility]


   Last Admin: 11/25/18 09:46 Dose:  3 ml


Alprazolam (Xanax)  1 mg PO DAILY PRN


   PRN Reason: Anxiety


Aspirin (Ecotrin)  81 mg PO DAILY Pending sale to [facility]


   Last Admin: 11/25/18 10:03 Dose:  81 mg


Dextrose (Dextrose 50% Inj)  0 ml IV STAT PRN; Protocol


   PRN Reason: Hypoglycemia Protocol


Dextrose (Glutose 15)  0 gm PO ONCE PRN; Protocol


   PRN Reason: Hypoglycemia Protocol


Enoxaparin Sodium (Lovenox)  40 mg SC DAILY Pending sale to [facility]


   Last Admin: 11/25/18 10:03 Dose:  40 mg


Fluticasone/Vilanterol (Breo Ellipta 100-25 Mcg Inh)  1 puff INH RQD Pending sale to [facility]


   Last Admin: 11/25/18 09:32 Dose:  1 puff


Gabapentin (Neurontin)  800 mg PO TID PRN


   PRN Reason: to give with dilaudid


   Last Admin: 11/25/18 05:07 Dose:  800 mg


Glucagon (Glucagen Diagnostic Kit)  0 mg IM STAT PRN; Protocol


   PRN Reason: Hypoglycemia Protocol


Home Med (Cholecalciferol (Vitamin D3) [Vitamin D3])  2,000 unit PO DAILY Pending sale to [facility]


Home Med (Cyanocobalamin (Vitamin B-12) [Vitamin B12])  2,500 mcg PO DAILY Pending sale to [facility]


Home Med (Fenofibrate [Fenofibrate])  40 mg PO DAILY Pending sale to [facility]


Home Med (L.Acidoph,Paracasei, B.Lactis [Probiotic])  1 each PO DAILY Pending sale to [facility]


Home Med (Lobaza)  1 gr PO DAILY Pending sale to [facility]


Home Med (Magnesium Amino Acid Chelate [Magnesium])  27 mg PO DAILY Pending sale to [facility]


Hydromorphone HCl (Dilaudid)  2 mg PO Q8 PRN


   PRN Reason: pain


   Last Admin: 11/25/18 05:07 Dose:  2 mg


Azithromycin 500 mg/ Sodium (Chloride)  250 mls @ 250 mls/hr IVPB DAILY Pending sale to [facility]; 

Protocol


   Last Admin: 11/25/18 10:04 Dose:  250 mls/hr


Dextrose (Dextrose 5% In Water 1000 Ml)  1,000 mls @ 0 mls/hr IV .Q0M PRN; 

Protocol


   PRN Reason: Hypoglycemia Protocol


Insulin Aspart (Novolog)  0 unit SC ACHS Pending sale to [facility]; Protocol


   Last Admin: 11/25/18 12:11 Dose:  5 units


Insulin Detemir (Levemir)  20 unit SC HS Pending sale to [facility]


   Last Admin: 11/25/18 00:44 Dose:  20 units


Levothyroxine Sodium (Synthroid)  100 mcg PO DAILY Pending sale to [facility]


   Last Admin: 11/25/18 10:04 Dose:  100 mcg


Lidocaine (Lidoderm)  1 ea TD DAILY PRN


   PRN Reason: pain


Methylprednisolone (Solu-Medrol)  40 mg IVP Q8H Pending sale to [facility]


   Stop: 11/27/18 19:31


   Last Admin: 11/25/18 12:18 Dose:  40 mg


Montelukast Sodium (Singulair)  10 mg PO HS Pending sale to [facility]


   Last Admin: 11/24/18 21:54 Dose:  10 mg


Pantoprazole Sodium (Protonix Ec Tab)  40 mg PO DAILY Pending sale to [facility]


Repaglinide (Prandin)  1 mg PO TID Pending sale to [facility]


   Last Admin: 11/25/18 10:04 Dose:  1 mg


Rosuvastatin Calcium (Crestor)  20 mg PO HS Pending sale to [facility]


Sucralfate (Carafate Tab)  1 gm PO QID Pending sale to [facility]


   Last Admin: 11/25/18 10:03 Dose:  1 gm











Physical Exam





- Constitutional


Appears: Non-toxic





- Head Exam


Head Exam: NORMAL INSPECTION





- Eye Exam


Eye Exam: Normal appearance





- ENT Exam


ENT Exam: Mucous Membranes Moist





- Neck Exam


Neck exam: Positive for: Full Rom





- Respiratory Exam


Respiratory Exam: NORMAL BREATHING PATTERN





- Cardiovascular Exam


Cardiovascular Exam: REGULAR RHYTHM





- GI/Abdominal Exam


GI & Abdominal Exam: Normal Bowel Sounds





- Rectal Exam


Rectal Exam: Deferred





- Extremities Exam


Extremities exam: Positive for: pedal edema





- Back Exam


Back exam: NORMAL INSPECTION





- Neurological Exam


Neurological exam: Alert, Oriented x3





- Psychiatric Exam


Psychiatric exam: Normal Affect





- Skin


Skin Exam: Normal Color





Results





- Vital Signs


Recent Vital Signs: 


                                Last Vital Signs











Temp  98.3 F   11/25/18 08:58


 


Pulse  97 H  11/25/18 08:58


 


Resp  24   11/25/18 08:58


 


BP  123/62   11/25/18 08:58


 


Pulse Ox  96   11/25/18 08:58














- Labs


Result Diagrams: 


                                 11/24/18 18:56





                                 11/24/18 18:56


Labs: 


                         Laboratory Results - last 24 hr











  11/24/18 11/24/18 11/24/18





  18:18 18:56 18:56


 


WBC   11.9 H 


 


RBC   3.92 


 


Hgb   12.2 


 


Hct   36.2 


 


MCV   92.2 


 


MCH   31.2 H 


 


MCHC   33.8 


 


RDW   14.3 


 


Plt Count   227 


 


MPV   10.4 


 


Neut % (Auto)   56.5 


 


Lymph % (Auto)   33.6 


 


Mono % (Auto)   6.7 


 


Eos % (Auto)   2.5 


 


Baso % (Auto)   0.7 


 


Neut # (Auto)   6.7 


 


Lymph # (Auto)   4.0 


 


Mono # (Auto)   0.8 


 


Eos # (Auto)   0.3 


 


Baso # (Auto)   0.1 


 


PT   


 


INR   


 


APTT   


 


Puncture Site   


 


pCO2   


 


pO2   


 


HCO3   


 


ABG pH   


 


ABG Total CO2   


 


ABG O2 Saturation   


 


ABG Base Excess   


 


Jenaro Test   


 


ABG Potassium   


 


A-a O2 Difference   


 


Respiratory Index   


 


Glucose   


 


Lactate   


 


FiO2   


 


Inspiratory BiPAP   


 


Expiratory BiPAP   


 


Crit Value Called To   


 


Crit Value Called By   


 


Crit Value Read Back   


 


Blood Gas Notified Time   


 


Sodium    140


 


Potassium    4.3


 


Chloride    99


 


Carbon Dioxide    22


 


Anion Gap    23 H


 


BUN    16


 


Creatinine    0.7


 


Est GFR ( Amer)    > 60


 


Est GFR (Non-Af Amer)    > 60


 


POC Glucose (mg/dL)   


 


Random Glucose    155 H


 


Calcium    9.8


 


Total Bilirubin    0.8


 


AST    52 H


 


ALT    35


 


Alkaline Phosphatase    77


 


Troponin I   


 


NT-Pro-B Natriuret Pep   


 


Total Protein    8.9 H


 


Albumin    4.7


 


Globulin    4.2 H


 


Albumin/Globulin Ratio    1.1


 


Arterial Blood Potassium   


 


Urine Opiates Screen   


 


Urine Methadone Screen   


 


Ur Barbiturates Screen   


 


Ur Phencyclidine Scrn   


 


Ur Amphetamines Screen   


 


U Benzodiazepines Scrn   


 


U Oth Cocaine Metabols   


 


U Cannabinoids Screen   


 


Influenza Typ A,B (EIA)  Negative for flu a/b  














  11/24/18 11/24/18 11/24/18





  19:00 19:34 19:38


 


WBC   


 


RBC   


 


Hgb   


 


Hct   


 


MCV   


 


MCH   


 


MCHC   


 


RDW   


 


Plt Count   


 


MPV   


 


Neut % (Auto)   


 


Lymph % (Auto)   


 


Mono % (Auto)   


 


Eos % (Auto)   


 


Baso % (Auto)   


 


Neut # (Auto)   


 


Lymph # (Auto)   


 


Mono # (Auto)   


 


Eos # (Auto)   


 


Baso # (Auto)   


 


PT   


 


INR   


 


APTT   


 


Puncture Site  Rra  


 


pCO2  22 L  


 


pO2  125 H  


 


HCO3  26.9  


 


ABG pH  7.61 H*  


 


ABG Total CO2  22.8  


 


ABG O2 Saturation  97.7  


 


ABG Base Excess  2.4  


 


Jenaro Test  Pos  


 


ABG Potassium  2.8 L  


 


A-a O2 Difference  61.0  


 


Respiratory Index  0.5  


 


Glucose  217 H  


 


Lactate  3.6 H  


 


FiO2  30.0  


 


Inspiratory BiPAP  10  


 


Expiratory BiPAP  5  


 


Crit Value Called To  Dr asif  


 


Crit Value Called By  [facility]  


 


Crit Value Read Back  Y  


 


Blood Gas Notified Time  1909  


 


Sodium  143.0  


 


Potassium   


 


Chloride  107.0  


 


Carbon Dioxide   


 


Anion Gap   


 


BUN   


 


Creatinine   


 


Est GFR ( Amer)   


 


Est GFR (Non-Af Amer)   


 


POC Glucose (mg/dL)   


 


Random Glucose   


 


Calcium   


 


Total Bilirubin   


 


AST   


 


ALT   


 


Alkaline Phosphatase   


 


Troponin I    < 0.0120


 


NT-Pro-B Natriuret Pep    43.0


 


Total Protein   


 


Albumin   


 


Globulin   


 


Albumin/Globulin Ratio   


 


Arterial Blood Potassium  2.8 L  


 


Urine Opiates Screen   Negative 


 


Urine Methadone Screen   Negative 


 


Ur Barbiturates Screen   Negative 


 


Ur Phencyclidine Scrn   Negative 


 


Ur Amphetamines Screen   Negative 


 


U Benzodiazepines Scrn   Negative 


 


U Oth Cocaine Metabols   Negative 


 


U Cannabinoids Screen   Negative 


 


Influenza Typ A,B (EIA)   














  11/24/18 11/25/18





  23:11 07:26


 


WBC  


 


RBC  


 


Hgb  


 


Hct  


 


MCV  


 


MCH  


 


MCHC  


 


RDW  


 


Plt Count  


 


MPV  


 


Neut % (Auto)  


 


Lymph % (Auto)  


 


Mono % (Auto)  


 


Eos % (Auto)  


 


Baso % (Auto)  


 


Neut # (Auto)  


 


Lymph # (Auto)  


 


Mono # (Auto)  


 


Eos # (Auto)  


 


Baso # (Auto)  


 


PT   13.0 H


 


INR   1.2


 


APTT   26


 


Puncture Site  


 


pCO2  


 


pO2  


 


HCO3  


 


ABG pH  


 


ABG Total CO2  


 


ABG O2 Saturation  


 


ABG Base Excess  


 


Jenaro Test  


 


ABG Potassium  


 


A-a O2 Difference  


 


Respiratory Index  


 


Glucose  


 


Lactate  


 


FiO2  


 


Inspiratory BiPAP  


 


Expiratory BiPAP  


 


Crit Value Called To  


 


Crit Value Called By  


 


Crit Value Read Back  


 


Blood Gas Notified Time  


 


Sodium  


 


Potassium  


 


Chloride  


 


Carbon Dioxide  


 


Anion Gap  


 


BUN  


 


Creatinine  


 


Est GFR ( Amer)  


 


Est GFR (Non-Af Amer)  


 


POC Glucose (mg/dL)  361 H 


 


Random Glucose  


 


Calcium  


 


Total Bilirubin  


 


AST  


 


ALT  


 


Alkaline Phosphatase  


 


Troponin I  


 


NT-Pro-B Natriuret Pep  


 


Total Protein  


 


Albumin  


 


Globulin  


 


Albumin/Globulin Ratio  


 


Arterial Blood Potassium  


 


Urine Opiates Screen  


 


Urine Methadone Screen  


 


Ur Barbiturates Screen  


 


Ur Phencyclidine Scrn  


 


Ur Amphetamines Screen  


 


U Benzodiazepines Scrn  


 


U Oth Cocaine Metabols  


 


U Cannabinoids Screen  


 


Influenza Typ A,B (EIA)  














- EKG Data


EKG Interpreted by: Myself


EKG shows normal: Sinus rhythm





Assessment & Plan


(1) Dyspnea


Assessment and Plan: 


symptoms suggestive of asthma.  however patient has DM which is risk factor for 

CAD.  medical therapy.  will check echocardiogram


Status: Acute   





(2) Diabetes


Assessment and Plan: 


blood sugar control


Status: Acute   





(3) HTN (hypertension)


Assessment and Plan: 


blood pressure is controlled.


Status: Acute

## 2018-11-26 RX ADMIN — INSULIN ASPART SCH U: 100 INJECTION, SOLUTION INTRAVENOUS; SUBCUTANEOUS at 12:14

## 2018-11-26 RX ADMIN — OMEGA-3-ACID ETHYL ESTERS SCH GM: 900 CAPSULE, LIQUID FILLED ORAL at 10:14

## 2018-11-26 RX ADMIN — IPRATROPIUM BROMIDE AND ALBUTEROL SULFATE SCH ML: .5; 3 SOLUTION RESPIRATORY (INHALATION) at 13:45

## 2018-11-26 RX ADMIN — INSULIN ASPART SCH: 100 INJECTION, SOLUTION INTRAVENOUS; SUBCUTANEOUS at 22:22

## 2018-11-26 RX ADMIN — INSULIN ASPART SCH UNITS: 100 INJECTION, SOLUTION INTRAVENOUS; SUBCUTANEOUS at 08:01

## 2018-11-26 RX ADMIN — IPRATROPIUM BROMIDE AND ALBUTEROL SULFATE SCH ML: .5; 3 SOLUTION RESPIRATORY (INHALATION) at 07:26

## 2018-11-26 RX ADMIN — IPRATROPIUM BROMIDE AND ALBUTEROL SULFATE SCH ML: .5; 3 SOLUTION RESPIRATORY (INHALATION) at 01:15

## 2018-11-26 RX ADMIN — ENOXAPARIN SODIUM SCH MG: 40 INJECTION SUBCUTANEOUS at 10:16

## 2018-11-26 RX ADMIN — INSULIN ASPART SCH UNITS: 100 INJECTION, SOLUTION INTRAVENOUS; SUBCUTANEOUS at 17:49

## 2018-11-26 RX ADMIN — PANTOPRAZOLE SODIUM SCH MG: 40 TABLET, DELAYED RELEASE ORAL at 10:13

## 2018-11-26 RX ADMIN — Medication SCH CAP: at 17:49

## 2018-11-26 RX ADMIN — FLUTICASONE FUROATE AND VILANTEROL TRIFENATATE SCH: 100; 25 POWDER RESPIRATORY (INHALATION) at 07:25

## 2018-11-26 RX ADMIN — METHYLPREDNISOLONE SODIUM SUCCINATE SCH MG: 40 INJECTION, POWDER, FOR SOLUTION INTRAMUSCULAR; INTRAVENOUS at 12:18

## 2018-11-26 RX ADMIN — METHYLPREDNISOLONE SODIUM SUCCINATE SCH MG: 40 INJECTION, POWDER, FOR SOLUTION INTRAMUSCULAR; INTRAVENOUS at 03:27

## 2018-11-26 RX ADMIN — IPRATROPIUM BROMIDE AND ALBUTEROL SULFATE SCH ML: .5; 3 SOLUTION RESPIRATORY (INHALATION) at 20:21

## 2018-11-26 RX ADMIN — INSULIN DETEMIR SCH: 100 INJECTION, SOLUTION SUBCUTANEOUS at 22:22

## 2018-11-26 RX ADMIN — METHYLPREDNISOLONE SODIUM SUCCINATE SCH MG: 40 INJECTION, POWDER, FOR SOLUTION INTRAMUSCULAR; INTRAVENOUS at 19:37

## 2018-11-26 RX ADMIN — Medication SCH CAP: at 10:10

## 2018-11-26 NOTE — CP.PCM.PN
Subjective





- Date & Time of Evaluation


Date of Evaluation: 11/26/18


Time of Evaluation: 14:31





- Subjective


Subjective: 


PGY3 Medicine Note for Dr. EDILBERTO Piedra





This patient was seen and examined at bedside this AM; denies any acute 

complaints; states had some trouble sleeping 2/2 to the bipap but breathing was 

much better compared to yesterday; denies all other complaints. 





Objective





- Vital Signs/Intake and Output


Vital Signs (last 24 hours): 


                                        











Temp Pulse Resp BP Pulse Ox


 


 97.9 F   90   18   106/66   97 


 


 11/26/18 08:22  11/26/18 09:02  11/26/18 08:22  11/26/18 08:22  11/26/18 13:10








Intake and Output: 


                                        











 11/26/18 11/26/18





 06:59 18:59


 


Output Total 400 


 


Balance -400 














- Medications


Medications: 


                               Current Medications





Albuterol/Ipratropium (Duoneb 3 Mg/0.5 Mg (3 Ml) Ud)  3 ml INH RQ6 [facility]


   Last Admin: 11/26/18 07:26 Dose:  3 ml


Alprazolam (Xanax)  1 mg PO DAILY PRN


   PRN Reason: Anxiety


   Last Admin: 11/26/18 01:33 Dose:  1 mg


Aspirin (Ecotrin)  81 mg PO DAILY [facility]


   Last Admin: 11/26/18 10:13 Dose:  81 mg


Cyanocobalamin (Vitamin B12 1000 Mcg Tab)  2,000 mcg PO DAILY [facility]


   Last Admin: 11/26/18 10:11 Dose:  2,000 mcg


Dextrose (Dextrose 50% Inj)  0 ml IV STAT PRN; Protocol


   PRN Reason: Hypoglycemia Protocol


Dextrose (Glutose 15)  0 gm PO ONCE PRN; Protocol


   PRN Reason: Hypoglycemia Protocol


Enoxaparin Sodium (Lovenox)  40 mg SC DAILY [facility]


   Last Admin: 11/26/18 10:16 Dose:  40 mg


Ergocalciferol (Drisdol 50,000 Intl Units Cap)  1 cap PO QWK [facility]


   Last Admin: 11/26/18 10:17 Dose:  1 cap


Fenofibrate (Tricor)  48 mg PO DAILY [facility]


   Last Admin: 11/26/18 10:13 Dose:  48 mg


Fluticasone/Vilanterol (Breo Ellipta 100-25 Mcg Inh)  1 puff INH RQD [facility]


   Last Admin: 11/26/18 07:25 Dose:  Not Given


Gabapentin (Neurontin)  800 mg PO TID PRN


   PRN Reason: to give with dilaudid


   Last Admin: 11/26/18 00:22 Dose:  800 mg


Glucagon (Glucagen Diagnostic Kit)  0 mg IM STAT PRN; Protocol


   PRN Reason: Hypoglycemia Protocol


Hydromorphone HCl (Dilaudid)  2 mg PO Q8 PRN


   PRN Reason: pain


   Last Admin: 11/26/18 00:23 Dose:  2 mg


Azithromycin 500 mg/ Sodium (Chloride)  250 mls @ 250 mls/hr IVPB DAILY [facility]; 

Protocol


   Last Admin: 11/26/18 12:22 Dose:  250 mls/hr


Dextrose (Dextrose 5% In Water 1000 Ml)  1,000 mls @ 0 mls/hr IV .Q0M PRN; 

Protocol


   PRN Reason: Hypoglycemia Protocol


Insulin Aspart (Novolog)  0 unit SC ACHS [facility]; Protocol


   Last Admin: 11/26/18 12:14 Dose:  6 u


Insulin Aspart (Novolog)  10 unit SC ONCE ONE


   Stop: 11/26/18 14:25


Insulin Detemir (Levemir)  35 unit SC [facility]


Lactobacillus Acidophilus (Bacid Acidophilus)  1 cap PO BID [facility]


   Last Admin: 11/26/18 10:10 Dose:  1 cap


Levothyroxine Sodium (Synthroid)  100 mcg PO DAILY@0630 [facility]


   Last Admin: 11/26/18 06:12 Dose:  100 mcg


Lidocaine (Lidoderm)  1 ea TD DAILY PRN


   PRN Reason: pain


Methylprednisolone (Solu-Medrol)  40 mg IVP Q8H [facility]


   Stop: 11/27/18 19:31


   Last Admin: 11/26/18 12:18 Dose:  40 mg


Montelukast Sodium (Singulair)  10 mg PO HS [facility]


   Last Admin: 11/24/18 21:54 Dose:  10 mg


Omega-3-Acid Ethyl Esters (Lovaza)  1 gm PO DAILY [facility]


   Last Admin: 11/26/18 10:14 Dose:  1 gm


Pantoprazole Sodium (Protonix Ec Tab)  40 mg PO DAILY [facility]


   Last Admin: 11/26/18 10:13 Dose:  40 mg


Pneumococcal Polyvalent Vaccine (Pneumovax 23 Vaccine)  0.5 ml IM .ONCE ONE


   Stop: 11/27/18 12:01


Repaglinide (Prandin)  1 mg PO TID [facility]


   Last Admin: 11/26/18 13:46 Dose:  1 mg


Rosuvastatin Calcium (Crestor)  20 mg PO HS [facility]


   Last Admin: 11/25/18 21:30 Dose:  20 mg


Sucralfate (Carafate Tab)  1 gm PO QID [facility]


   Last Admin: 11/26/18 13:46 Dose:  1 gm











- Labs


Labs: 


                                        





                                 11/24/18 18:56 





                                 11/24/18 18:56 





                                        











PT  13.0 SECONDS (9.7-12.2)  H  11/25/18  07:26    


 


INR  1.2   11/25/18  07:26    


 


APTT  26 SECONDS (21-34)   11/25/18  07:26    














- Constitutional


Appears: Non-toxic





- Head Exam


Head Exam: ATRAUMATIC





- Eye Exam


Eye Exam: EOMI, Normal appearance


Pupil Exam: PERRL





- ENT Exam


ENT Exam: Mucous Membranes Moist





- Neck Exam


Neck Exam: Full ROM.  absent: Lymphadenopathy





- Respiratory Exam


Respiratory Exam: Clear to Ausculation Bilateral, NORMAL BREATHING PATTERN.  

absent: Rales, Rhonchi, Wheezes





- Cardiovascular Exam


Cardiovascular Exam: REGULAR RHYTHM, +S1, +S2





- GI/Abdominal Exam


GI & Abdominal Exam: Soft, Normal Bowel Sounds.  absent: Tenderness





- Extremities Exam


Extremities Exam: Full ROM.  absent: Calf Tenderness





- Back Exam


Back Exam: NORMAL INSPECTION.  absent: CVA tenderness (L), CVA tenderness (R)





- Neurological Exam


Neurological Exam: Alert, Awake, Oriented x3





- Psychiatric Exam


Psychiatric exam: Normal Affect





- Skin


Skin Exam: Warm





Assessment and Plan





- Assessment and Plan (Free Text)


Assessment: 


67yo F admitted for COPD exacerbation





COPD exacerbation; improving


-BiPap at night


-SOlumedrol taper 40mg IV Q8H currently


-duoneb q4h, albuterol q4h


-azithromycin 500mg IV daily 


-monteleukast 10mg daily 


-breo-elipta daily 


DM; uncontrolled


-increasing RISS/nighttime insulin 2/2 to steroid use


-patient will take same insulin as outpatient with increased meal time dosing as

will be d/c on steroids





Anxiety


-c/w xanax 1mg daily at night





Neuropathy


-gabapentin 300mg TID 





Hypothyroidism


-100mcg daily 





HLD


-Lovaza 1g and Crestor 20 daily





DM


-RISS high


-35 Units Levemir nighttime 


-replanigide 





Prophylaxis 


-GI prophyalxis not indicated as patient eating


-Lovenox Sc





Patient seen and examined w/ Dr. EDILBERTO Suazo PGY3

## 2018-11-26 NOTE — CP.PCM.PN
Subjective





- Date & Time of Evaluation


Date of Evaluation: 11/26/18


Time of Evaluation: 10:16





- Subjective


Subjective: 





PT ALERT, SLEPT OK ON 2L O2. LESS COUGH, STILL SOB.  ROS; OTHERWISE NEG





Objective





- Vital Signs/Intake and Output


Vital Signs (last 24 hours): 


                                        











Temp Pulse Resp BP Pulse Ox


 


 97.9 F   90   18   106/66   96 


 


 11/26/18 08:22  11/26/18 09:02  11/26/18 08:22  11/26/18 08:22  11/26/18 08:22








Intake and Output: 


                                        











 11/26/18 11/26/18





 06:59 18:59


 


Output Total 400 


 


Balance -400 














- Medications


Medications: 


                               Current Medications





Albuterol/Ipratropium (Duoneb 3 Mg/0.5 Mg (3 Ml) Ud)  3 ml INH RQ6 [facility]


   Last Admin: 11/26/18 07:26 Dose:  3 ml


Alprazolam (Xanax)  1 mg PO DAILY PRN


   PRN Reason: Anxiety


   Last Admin: 11/26/18 01:33 Dose:  1 mg


Aspirin (Ecotrin)  81 mg PO DAILY [facility]


   Last Admin: 11/25/18 10:03 Dose:  81 mg


Cyanocobalamin (Vitamin B12 1000 Mcg Tab)  2,000 mcg PO DAILY [facility]


Dextrose (Dextrose 50% Inj)  0 ml IV STAT PRN; Protocol


   PRN Reason: Hypoglycemia Protocol


Dextrose (Glutose 15)  0 gm PO ONCE PRN; Protocol


   PRN Reason: Hypoglycemia Protocol


Enoxaparin Sodium (Lovenox)  40 mg SC DAILY [facility]


   Last Admin: 11/25/18 10:03 Dose:  40 mg


Ergocalciferol (Drisdol 50,000 Intl Units Cap)  1 cap PO QWK [facility]


Fenofibrate (Tricor)  48 mg PO DAILY [facility]


Fluticasone/Vilanterol (Breo Ellipta 100-25 Mcg Inh)  1 puff INH RQD [facility]


   Last Admin: 11/26/18 07:25 Dose:  Not Given


Gabapentin (Neurontin)  800 mg PO TID PRN


   PRN Reason: to give with dilaudid


   Last Admin: 11/26/18 00:22 Dose:  800 mg


Glucagon (Glucagen Diagnostic Kit)  0 mg IM STAT PRN; Protocol


   PRN Reason: Hypoglycemia Protocol


Hydromorphone HCl (Dilaudid)  2 mg PO Q8 PRN


   PRN Reason: pain


   Last Admin: 11/26/18 00:23 Dose:  2 mg


Azithromycin 500 mg/ Sodium (Chloride)  250 mls @ 250 mls/hr IVPB DAILY [facility]; 

Protocol


   Last Admin: 11/25/18 10:04 Dose:  250 mls/hr


Dextrose (Dextrose 5% In Water 1000 Ml)  1,000 mls @ 0 mls/hr IV .Q0M PRN; 

Protocol


   PRN Reason: Hypoglycemia Protocol


Ins[facility] (Nov[facility])  [facility]; Protocol


Insulin Detemir (Levemir)  35 unit SC [facility]


Lactobacillus Acidophilus (Bacid Acidophilus)  1 cap PO BID [facility]


   Last Admin: 11/25/18 19:32 Dose:  1 cap


Levothyroxine Sodium (Synthroid)  100 mcg PO DAILY@0630 [facility]


   Last Admin: 11/26/18 06:12 Dose:  100 mcg


Lidocaine (Lidoderm)  1 ea TD DAILY PRN


   PRN Reason: pain


Methylprednisolone (Solu-Medrol)  40 mg IVP Q8H [facility]


   Stop: 11/27/18 19:31


   Last Admin: 11/26/18 03:27 Dose:  40 mg


Montelukast Sodium (Singulair)  10 mg PO [facility]


   Last Admin: 11/24/18 21:54 Dose:  10 mg


Omega-3-Acid Ethyl Esters (Lovaza)  1 gm PO DAILY [facility]


Pantoprazole Sodium (Protonix Ec Tab)  40 mg PO DAILY [facility]


   Last Admin: 11/25/18 15:02 Dose:  40 mg


Pneumococcal Polyvalent Vaccine (Pneumovax 23 Vaccine)  0.5 ml IM .ONCE ONE


   Stop: 11/27/18 12:01


Repaglinide (Prandin)  1 mg PO TID [facility]


   Last Admin: 11/25/18 18:10 Dose:  1 mg


Rosuvastatin Calcium (Crestor)  20 mg PO [facility]


   Last Admin: 11/25/18 21:30 Dose:  20 mg


Sucralfate (Carafate Tab)  1 gm PO QID [facility]


   Last Admin: 11/25/18 21:30 Dose:  1 gm











- Labs


Labs: 


                                        





                                 11/24/18 18:56 





                                 11/24/18 18:56 





                                        











PT  13.0 SECONDS (9.7-12.2)  H  11/25/18  07:26    


 


INR  1.2   11/25/18  07:26    


 


APTT  26 SECONDS (21-34)   11/25/18  07:26    














- Constitutional


Appears: No Acute Distress





- Head Exam


Head Exam: ATRAUMATIC, NORMOCEPHALIC





- Eye Exam


Eye Exam: EOMI, Normal appearance





- ENT Exam


ENT Exam: Mucous Membranes Moist





- Neck Exam


Neck Exam: Normal Inspection





- Respiratory Exam


Respiratory Exam: Decreased Breath Sounds, Wheezes.  absent: Respiratory 

Distress





- Cardiovascular Exam


Cardiovascular Exam: RRR, +S1, +S2





- GI/Abdominal Exam


GI & Abdominal Exam: Soft.  absent: Tenderness





- Rectal Exam


Rectal Exam: Deferred





- Extremities Exam


Extremities Exam: absent: Calf Tenderness, Pedal Edema





- Back Exam


Back Exam: absent: CVA tenderness (L), CVA tenderness (R)





- Neurological Exam


Neurological Exam: Alert, Awake, CN II-XII Intact, Oriented x3





- Psychiatric Exam


Psychiatric exam: Normal Affect, Normal Mood





- Skin


Skin Exam: absent: Rash





Assessment and Plan


(1) COPD exacerbation


Status: Acute   





(2) Diabetes


Status: Acute   





(3) Breast cancer


Status: Acute   





(4) Hyperlipidemia


Status: Acute   





(5) Hypothyroid


Status: Acute   





(6) HTN (hypertension)


Status: Acute   





- Assessment and Plan (Free Text)


Assessment: 





RESP STATUS LESS DYSPNEIC., STILL WITH BRONCHOSPASM., CONT NEB BD., IV 

STEROIDS.. PEAK FLOW 100-150 NOW. MONITOR O2 SAT. CXR REVIEWED. CONT AB. 

INCREASE OOB. DISCUSSED WITH STAFF AND RT AT LENGTH.

## 2018-11-26 NOTE — CP.PCM.PN
Subjective





- Date & Time of Evaluation


Date of Evaluation: 11/26/18


Time of Evaluation: 10:40





- Subjective


Subjective: 


clinically same





Objective





- Vital Signs/Intake and Output


Vital Signs (last 24 hours): 


                                        











Temp Pulse Resp BP Pulse Ox


 


 98.3 F   102 H  20   119/66   96 


 


 11/26/18 15:00  11/26/18 15:00  11/26/18 15:00  11/26/18 15:00  11/26/18 15:00











- Medications


Medications: 


                               Current Medications





Albuterol/Ipratropium (Duoneb 3 Mg/0.5 Mg (3 Ml) Ud)  3 ml INH RQ6 [facility]


   Last Admin: 11/26/18 20:21 Dose:  3 ml


Alprazolam (Xanax)  1 mg PO DAILY PRN


   PRN Reason: Anxiety


   Last Admin: 11/26/18 01:33 Dose:  1 mg


Aspirin (Ecotrin)  81 mg PO DAILY [facility]


   Last Admin: 11/26/18 10:13 Dose:  81 mg


Cyanocobalamin (Vitamin B12 1000 Mcg Tab)  2,000 mcg PO DAILY [facility]


   Last Admin: 11/26/18 10:11 Dose:  2,000 mcg


Dextrose (Dextrose 50% Inj)  0 ml IV STAT PRN; Protocol


   PRN Reason: Hypoglycemia Protocol


Dextrose (Glutose 15)  0 gm PO ONCE PRN; Protocol


   PRN Reason: Hypoglycemia Protocol


Enoxaparin Sodium (Lovenox)  40 mg SC DAILY [facility]


   Last Admin: 11/26/18 10:16 Dose:  40 mg


Ergocalciferol (Drisdol 50,000 Intl Units Cap)  1 cap PO QWK [facility]


   Last Admin: 11/26/18 10:17 Dose:  1 cap


Fenofibrate (Tricor)  48 mg PO DAILY [facility]


   Last Admin: 11/26/18 10:13 Dose:  48 mg


Fluticasone/Vilanterol (Breo Ellipta 100-25 Mcg Inh)  1 puff INH RQD [facility]


   Last Admin: 11/26/18 07:25 Dose:  Not Given


Gabapentin (Neurontin)  800 mg PO TID PRN


   PRN Reason: to give with dilaudid


   Last Admin: 11/26/18 19:31 Dose:  800 mg


Glucagon (Glucagen Diagnostic Kit)  0 mg IM STAT PRN; Protocol


   PRN Reason: Hypoglycemia Protocol


Hydromorphone HCl (Dilaudid)  2 mg PO Q8 PRN


   PRN Reason: pain


   Last Admin: 11/26/18 19:31 Dose:  2 mg


Azithromycin 500 mg/ Sodium (Chloride)  250 mls @ 250 mls/hr IVPB DAILY [facility]; 

Protocol


   Last Admin: 11/26/18 12:22 Dose:  250 mls/hr


Dextrose (Dextrose 5% In Water 1000 Ml)  1,000 mls @ 0 mls/hr IV .Q0M PRN; 

Protocol


   PRN Reason: Hypoglycemia Protocol


Insulin Aspart (Novolog)  0 unit SC ACHS [facility]; Protocol


   Last Admin: 11/26/18 22:22 Dose:  Not Given


Insulin Detemir (Levemir)  35 unit SC [facility]


   Last Admin: 11/26/18 22:22 Dose:  Not Given


Lactobacillus Acidophilus (Bacid Acidophilus)  1 cap PO BID [facility]


   Last Admin: 11/26/18 17:49 Dose:  1 cap


Levothyroxine Sodium (Synthroid)  100 mcg PO DAILY@0630 [facility]


   Last Admin: 11/26/18 06:12 Dose:  100 mcg


Lidocaine (Lidoderm)  1 ea TD DAILY PRN


   PRN Reason: pain


Methylprednisolone (Solu-Medrol)  40 mg IVP Q8H [facility]


   Stop: 11/27/18 19:31


   Last Admin: 11/26/18 19:37 Dose:  40 mg


Montelukast Sodium (Singulair)  10 mg PO [facility]


   Last Admin: 11/26/18 23:12 Dose:  10 mg


Omega-3-Acid Ethyl Esters (Lovaza)  1 gm PO DAILY [facility]


   Last Admin: 11/26/18 10:14 Dose:  1 gm


Pantoprazole Sodium (Protonix Ec Tab)  40 mg PO DAILY [facility]


   Last Admin: 11/26/18 10:13 Dose:  40 mg


Pneumococcal Polyvalent Vaccine (Pneumovax 23 Vaccine)  0.5 ml IM .ONCE ONE


   Stop: 11/27/18 12:01


Repaglinide (Prandin)  1 mg PO TID [facility]


   Last Admin: 11/26/18 17:49 Dose:  1 mg


Rosuvastatin Calcium (Crestor)  20 mg PO [facility]


   Last Admin: 11/26/18 23:12 Dose:  20 mg


Sucralfate (Carafate Tab)  1 gm PO QID [facility]


   Last Admin: 11/26/18 23:12 Dose:  1 gm











- Labs


Labs: 


                                        





                                 11/24/18 18:56 





                                 11/24/18 18:56 





                                        











PT  13.0 SECONDS (9.7-12.2)  H  11/25/18  07:26    


 


INR  1.2   11/25/18  07:26    


 


APTT  26 SECONDS (21-34)   11/25/18  07:26    














- Constitutional


Appears: Well





- Head Exam


Head Exam: ATRAUMATIC, NORMAL INSPECTION, NORMOCEPHALIC





- Eye Exam


Eye Exam: EOMI, Normal appearance, PERRL


Pupil Exam: NORMAL ACCOMODATION, PERRL





- ENT Exam


ENT Exam: Mucous Membranes Moist, Normal Exam





- Neck Exam


Neck Exam: Full ROM, Normal Inspection.  absent: Lymphadenopathy





- Respiratory Exam


Respiratory Exam: Decreased Breath Sounds





- Cardiovascular Exam


Cardiovascular Exam: REGULAR RHYTHM, +S1, +S2





- GI/Abdominal Exam


GI & Abdominal Exam: Soft, Diminished Bowel Sounds





- Rectal Exam


Rectal Exam: Deferred

## 2018-11-26 NOTE — CARD
--------------- APPROVED REPORT --------------





Date of service: 11/24/2018



EKG Measurement

Heart [id number]

IA [id number]

[id number]

[id number]

[id number]



<Conclusion>

Sinus tachycardia

Cannot rule out Anterior infarct, age undetermined

Abnormal ECG

## 2018-11-27 LAB
ALBUMIN SERPL-MCNC: 4.1 G/DL (ref 3.5–5)
ALBUMIN/GLOB SERPL: 1.2 {RATIO} (ref 1–2.1)
ALT SERPL-CCNC: 27 U/L (ref 9–52)
AST SERPL-CCNC: 22 U/L (ref 14–36)
BASOPHILS # BLD AUTO: 0 K/UL (ref 0–0.2)
BASOPHILS NFR BLD: 0.1 % (ref 0–2)
BUN SERPL-MCNC: 29 MG/DL (ref 7–17)
CALCIUM SERPL-MCNC: 9.2 MG/DL (ref 8.6–10.4)
EOSINOPHIL # BLD AUTO: 0 K/UL (ref 0–0.7)
EOSINOPHIL NFR BLD: 0 % (ref 0–4)
ERYTHROCYTE [DISTWIDTH] IN BLOOD BY AUTOMATED COUNT: 14.7 % (ref 11.5–14.5)
GFR NON-AFRICAN AMERICAN: > 60
HGB BLD-MCNC: 11 G/DL (ref 11–16)
HYPOCHROMIC: SLIGHT
LYMPHOCYTE: 5 % (ref 20–40)
LYMPHOCYTES # BLD AUTO: 0.9 K/UL (ref 1–4.3)
LYMPHOCYTES NFR BLD AUTO: 5 % (ref 20–40)
MCH RBC QN AUTO: 31.1 PG (ref 27–31)
MCHC RBC AUTO-ENTMCNC: 33.3 G/DL (ref 33–37)
MCV RBC AUTO: 93.5 FL (ref 81–99)
MONOCYTE: 5 % (ref 0–10)
MONOCYTES # BLD: 0.5 K/UL (ref 0–0.8)
MONOCYTES NFR BLD: 2.6 % (ref 0–10)
MYELOCYTES NFR BLD: 1 % (ref 0–0)
NEUTROPHILS # BLD: 17.1 K/UL (ref 1.8–7)
NEUTROPHILS NFR BLD AUTO: 87 % (ref 50–75)
NEUTROPHILS NFR BLD AUTO: 92.3 % (ref 50–75)
NEUTS BAND NFR BLD: 2 % (ref 0–2)
NRBC BLD AUTO-RTO: 0 % (ref 0–2)
PLATELET # BLD EST: NORMAL 10*3/UL
PLATELET # BLD: 234 K/UL (ref 130–400)
PMV BLD AUTO: 10.3 FL (ref 7.2–11.7)
RBC # BLD AUTO: 3.53 MIL/UL (ref 3.8–5.2)
TARGETS BLD QL SMEAR: SLIGHT
TOTAL CELLS COUNTED BLD: 100
WBC # BLD AUTO: 18.5 K/UL (ref 4.8–10.8)

## 2018-11-27 RX ADMIN — INSULIN ASPART SCH UNITS: 100 INJECTION, SOLUTION INTRAVENOUS; SUBCUTANEOUS at 22:20

## 2018-11-27 RX ADMIN — Medication SCH CAP: at 10:38

## 2018-11-27 RX ADMIN — INSULIN ASPART SCH UNITS: 100 INJECTION, SOLUTION INTRAVENOUS; SUBCUTANEOUS at 08:05

## 2018-11-27 RX ADMIN — IPRATROPIUM BROMIDE AND ALBUTEROL SULFATE SCH ML: .5; 3 SOLUTION RESPIRATORY (INHALATION) at 13:40

## 2018-11-27 RX ADMIN — IPRATROPIUM BROMIDE AND ALBUTEROL SULFATE SCH ML: .5; 3 SOLUTION RESPIRATORY (INHALATION) at 07:18

## 2018-11-27 RX ADMIN — ENOXAPARIN SODIUM SCH MG: 40 INJECTION SUBCUTANEOUS at 10:46

## 2018-11-27 RX ADMIN — INSULIN DETEMIR SCH UNITS: 100 INJECTION, SOLUTION SUBCUTANEOUS at 22:16

## 2018-11-27 RX ADMIN — INSULIN ASPART SCH UNITS: 100 INJECTION, SOLUTION INTRAVENOUS; SUBCUTANEOUS at 12:38

## 2018-11-27 RX ADMIN — FLUTICASONE FUROATE AND VILANTEROL TRIFENATATE SCH PUFF: 100; 25 POWDER RESPIRATORY (INHALATION) at 07:30

## 2018-11-27 RX ADMIN — Medication SCH CAP: at 17:42

## 2018-11-27 RX ADMIN — METHYLPREDNISOLONE SODIUM SUCCINATE SCH MG: 40 INJECTION, POWDER, FOR SOLUTION INTRAMUSCULAR; INTRAVENOUS at 12:40

## 2018-11-27 RX ADMIN — PANTOPRAZOLE SODIUM SCH MG: 40 TABLET, DELAYED RELEASE ORAL at 10:36

## 2018-11-27 RX ADMIN — IPRATROPIUM BROMIDE AND ALBUTEROL SULFATE SCH ML: .5; 3 SOLUTION RESPIRATORY (INHALATION) at 20:05

## 2018-11-27 RX ADMIN — OMEGA-3-ACID ETHYL ESTERS SCH GM: 900 CAPSULE, LIQUID FILLED ORAL at 10:37

## 2018-11-27 RX ADMIN — METHYLPREDNISOLONE SODIUM SUCCINATE SCH MG: 40 INJECTION, POWDER, FOR SOLUTION INTRAMUSCULAR; INTRAVENOUS at 20:17

## 2018-11-27 RX ADMIN — INSULIN ASPART SCH UNITS: 100 INJECTION, SOLUTION INTRAVENOUS; SUBCUTANEOUS at 17:42

## 2018-11-27 RX ADMIN — IPRATROPIUM BROMIDE AND ALBUTEROL SULFATE SCH ML: .5; 3 SOLUTION RESPIRATORY (INHALATION) at 01:30

## 2018-11-27 RX ADMIN — METHYLPREDNISOLONE SODIUM SUCCINATE SCH MG: 40 INJECTION, POWDER, FOR SOLUTION INTRAMUSCULAR; INTRAVENOUS at 02:50

## 2018-11-27 NOTE — CP.PCM.PN
Subjective





- Date & Time of Evaluation


Date of Evaluation: 11/27/18


Time of Evaluation: 07:20





- Subjective


Subjective: 


Medicine progress note ( Dr. Arce's service





Patient was seen and examined at bedside, while resting in bed comfortably. 

Patient reports that she is doing well with significant symptomatic improvement.

Patient denies any symptoms of fever, chills, nausea, vomiting, chest pain, 

palpitations, shortness of breath or cough. 








Objective





- Vital Signs/Intake and Output


Vital Signs (last 24 hours): 


                                        











Temp Pulse Resp BP Pulse Ox


 


 97.7 F   86   20   123/73   97 


 


 11/27/18 15:00  11/27/18 15:00  11/27/18 15:00  11/27/18 15:00  11/27/18 15:00








Intake and Output: 


                                        











 11/27/18 11/27/18





 06:59 18:59


 


Output Total  1600


 


Balance  -1600














- Medications


Medications: 


                               Current Medications





Albuterol/Ipratropium (Duoneb 3 Mg/0.5 Mg (3 Ml) Ud)  3 ml INH RQ6 [facility]


   Last Admin: 11/27/18 13:40 Dose:  3 ml


Alprazolam (Xanax)  1 mg PO DAILY PRN


   PRN Reason: Anxiety


   Last Admin: 11/26/18 01:33 Dose:  1 mg


Aspirin (Ecotrin)  81 mg PO DAILY [facility]


   Last Admin: 11/27/18 10:37 Dose:  81 mg


Cyanocobalamin (Vitamin B12 1000 Mcg Tab)  2,000 mcg PO DAILY [facility]


   Last Admin: 11/27/18 10:40 Dose:  2,000 mcg


Dextrose (Dextrose 50% Inj)  0 ml IV STAT PRN; Protocol


   PRN Reason: Hypoglycemia Protocol


Dextrose (Glutose 15)  0 gm PO ONCE PRN; Protocol


   PRN Reason: Hypoglycemia Protocol


Enoxaparin Sodium (Lovenox)  40 mg SC DAILY [facility]


   Last Admin: 11/27/18 10:46 Dose:  40 mg


Ergocalciferol (Drisdol 50,000 Intl Units Cap)  1 cap PO QWK [facility]


   Last Admin: 11/26/18 10:17 Dose:  1 cap


Fenofibrate (Tricor)  48 mg PO DAILY [facility]


   Last Admin: 11/27/18 10:38 Dose:  48 mg


Fluticasone/Vilanterol (Breo Ellipta 100-25 Mcg Inh)  1 puff INH RQD [facility]


   Last Admin: 11/27/18 07:30 Dose:  1 puff


Gabapentin (Neurontin)  800 mg PO TID PRN


   PRN Reason: to give with dilaudid


   Last Admin: 11/27/18 10:46 Dose:  800 mg


Glucagon (Glucagen Diagnostic Kit)  0 mg IM STAT PRN; Protocol


   PRN Reason: Hypoglycemia Protocol


Hydromorphone HCl (Dilaudid)  2 mg PO Q8 PRN


   PRN Reason: pain


   Last Admin: 11/27/18 10:44 Dose:  2 mg


Azithromycin 500 mg/ Sodium (Chloride)  250 mls @ 250 mls/hr IVPB DAILY [facility]; 

Protocol


   Last Admin: 11/27/18 10:47 Dose:  250 mls/hr


Dextrose (Dextrose 5% In Water 1000 Ml)  1,000 mls @ 0 mls/hr IV .Q0M PRN; 

Protocol


   PRN Reason: Hypoglycemia Protocol


Insulin Aspart (Novolog)  0 unit SC ACHS [facility]; Protocol


   Last Admin: 11/27/18 12:38 Dose:  12 units


Insulin Detemir (Levemir)  35 unit SC [facility]


   Last Admin: 11/26/18 22:22 Dose:  Not Given


Lactobacillus Acidophilus (Bacid Acidophilus)  1 cap PO BID [facility]


   Last Admin: 11/27/18 10:38 Dose:  1 cap


Levothyroxine Sodium (Synthroid)  100 mcg PO DAILY@0630 [facility]


   Last Admin: 11/27/18 06:37 Dose:  100 mcg


Lidocaine (Lidoderm)  1 ea TD DAILY PRN


   PRN Reason: pain


Methylprednisolone (Solu-Medrol)  30 mg IVP Q8H [facility]


   Stop: 11/27/18 19:31


   Last Admin: 11/27/18 12:40 Dose:  30 mg


Montelukast Sodium (Singulair)  10 mg PO HS [facility]


   Last Admin: 11/26/18 23:12 Dose:  10 mg


Omega-3-Acid Ethyl Esters (Lovaza)  1 gm PO DAILY [facility]


   Last Admin: 11/27/18 10:37 Dose:  1 gm


Pantoprazole Sodium (Protonix Ec Tab)  40 mg PO DAILY [facility]


   Last Admin: 11/27/18 10:36 Dose:  40 mg


Repaglinide (Prandin)  1 mg PO TID [facility]


   Last Admin: 11/27/18 14:27 Dose:  1 mg


Rosuvastatin Calcium (Crestor)  20 mg PO HS [facility]


   Last Admin: 11/26/18 23:12 Dose:  20 mg


Sucralfate (Carafate Tab)  1 gm PO QID [facility]


   Last Admin: 11/27/18 14:27 Dose:  1 gm











- Labs


Labs: 


                                        





                                 11/27/18 11:36 





                                 11/27/18 11:36 





                                        











PT  13.0 SECONDS (9.7-12.2)  H  11/25/18  07:26    


 


INR  1.2   11/25/18  07:26    


 


APTT  26 SECONDS (21-34)   11/25/18  07:26    














- Constitutional


Appears: Well, No Acute Distress





- Head Exam


Head Exam: ATRAUMATIC, NORMAL INSPECTION





- Eye Exam


Eye Exam: EOMI, Normal appearance





- ENT Exam


ENT Exam: Mucous Membranes Moist





- Respiratory Exam


Respiratory Exam: NORMAL BREATHING PATTERN


Additional comments: 


 Very mild diffuse expiratory wheezing 








- Cardiovascular Exam


Cardiovascular Exam: REGULAR RHYTHM, +S1, +S2.  absent: Murmur





- GI/Abdominal Exam


GI & Abdominal Exam: Soft, Normal Bowel Sounds.  absent: Firm, Guarding, Rigid, 

Tenderness





- Extremities Exam


Extremities Exam: Normal Inspection.  absent: Calf Tenderness, Pedal Edema





- Neurological Exam


Neurological Exam: Alert, Awake, Oriented x3





Assessment and Plan


(1) COPD exacerbation


Assessment & Plan: 


In combination of COPD and asthma exacerbation 


-BiPap at night


-SOlumedrol taper 40mg IV Q8H currently


-duoneb q4h, albuterol q4h


-azithromycin 500mg IV daily 


-monteleukast 10mg daily 


-breo-elipta daily 


DM; uncontrolled


-increasing RISS/nighttime insulin 2/2 to steroid use


-patient will take same insulin as outpatient with increased meal time dosing as

will be d/c on steroids





Status: Acute   





(2) Anxiety


Assessment & Plan: 


-c/w xanax 1mg daily at night





Status: Acute   





(3) Neuropathy


Assessment & Plan: 


-gabapentin 300mg TID 


Status: Acute   





(4) Hypothyroid


Assessment & Plan: 


Levothyroxine 100mcg PO daily 


Status: Acute   





(5) Hyperlipidemia


Assessment & Plan: 


-Lovaza 1g, Crestor 20 daily and tricor 48mg PO daily 





Status: Acute   





(6) Diabetes


Assessment & Plan: 


-RISS high


-35 Units Levemir nighttime 


-replanigide 


Status: Acute   





(7) Prophylactic measure


Assessment & Plan: 


-GI prophyalxis not indicated as patient eating


-Lovenox 40mg SC daily 





Disposition: Plans for discharge tomorrow 


Patient seen and examined w/ Dr. EDILBERTO Piedra








Status: Acute

## 2018-11-27 NOTE — CP.PCM.PN
Subjective





- Date & Time of Evaluation


Date of Evaluation: 11/27/18


Time of Evaluation: 11:10





- Subjective


Subjective: 


clinically same





Objective





- Vital Signs/Intake and Output


Vital Signs (last 24 hours): 


                                        











Temp Pulse Resp BP Pulse Ox


 


 98.0 F   93 H  20   107/65   96 


 


 11/27/18 07:30  11/27/18 08:05  11/27/18 07:30  11/27/18 07:30  11/27/18 07:30











- Medications


Medications: 


                               Current Medications





Albuterol/Ipratropium (Duoneb 3 Mg/0.5 Mg (3 Ml) Ud)  3 ml INH RQ6 [facility]


   Last Admin: 11/27/18 07:18 Dose:  3 ml


Alprazolam (Xanax)  1 mg PO DAILY PRN


   PRN Reason: Anxiety


   Last Admin: 11/26/18 01:33 Dose:  1 mg


Aspirin (Ecotrin)  81 mg PO DAILY [facility]


   Last Admin: 11/27/18 10:37 Dose:  81 mg


Cyanocobalamin (Vitamin B12 1000 Mcg Tab)  2,000 mcg PO DAILY [facility]


   Last Admin: 11/27/18 10:40 Dose:  2,000 mcg


Dextrose (Dextrose 50% Inj)  0 ml IV STAT PRN; Protocol


   PRN Reason: Hypoglycemia Protocol


Dextrose (Glutose 15)  0 gm PO ONCE PRN; Protocol


   PRN Reason: Hypoglycemia Protocol


Enoxaparin Sodium (Lovenox)  40 mg SC DAILY [facility]


   Last Admin: 11/27/18 10:46 Dose:  40 mg


Ergocalciferol (Drisdol 50,000 Intl Units Cap)  1 cap PO QWK [facility]


   Last Admin: 11/26/18 10:17 Dose:  1 cap


Fenofibrate (Tricor)  48 mg PO DAILY [facility]


   Last Admin: 11/27/18 10:38 Dose:  48 mg


Fluticasone/Vilanterol (Breo Ellipta 100-25 Mcg Inh)  1 puff INH RQD [facility]


   Last Admin: 11/27/18 07:30 Dose:  1 puff


Gabapentin (Neurontin)  800 mg PO TID PRN


   PRN Reason: to give with dilaudid


   Last Admin: 11/27/18 10:46 Dose:  800 mg


Glucagon (Glucagen Diagnostic Kit)  0 mg IM STAT PRN; Protocol


   PRN Reason: Hypoglycemia Protocol


Hydromorphone HCl (Dilaudid)  2 mg PO Q8 PRN


   PRN Reason: pain


   Last Admin: 11/27/18 10:44 Dose:  2 mg


Azithromycin 500 mg/ Sodium (Chloride)  250 mls @ 250 mls/hr IVPB DAILY [facility]; 

Protocol


   Last Admin: 11/27/18 10:47 Dose:  250 mls/hr


Dextrose (Dextrose 5% In Water 1000 Ml)  1,000 mls @ 0 mls/hr IV .Q0M PRN; 

Protocol


   PRN Reason: Hypoglycemia Protocol


Insulin Aspart (Novolog)  0 unit SC ACHS [facility]; Protocol


   Last Admin: 11/27/18 12:38 Dose:  12 units


Insulin Detemir (Levemir)  35 unit SC HS [facility]


   Last Admin: 11/26/18 22:22 Dose:  Not Given


Lactobacillus Acidophilus (Bacid Acidophilus)  1 cap PO BID [facility]


   Last Admin: 11/27/18 10:38 Dose:  1 cap


Levothyroxine Sodium (Synthroid)  100 mcg PO DAILY@0630 [facility]


   Last Admin: 11/27/18 06:37 Dose:  100 mcg


Lidocaine (Lidoderm)  1 ea TD DAILY PRN


   PRN Reason: pain


Methylprednisolone (Solu-Medrol)  30 mg IVP Q8H [facility]


   Stop: 11/27/18 19:31


   Last Admin: 11/27/18 12:40 Dose:  30 mg


Montelukast Sodium (Singulair)  10 mg PO [facility]


   Last Admin: 11/26/18 23:12 Dose:  10 mg


Omega-3-Acid Ethyl Esters (Lovaza)  1 gm PO DAILY [facility]


   Last Admin: 11/27/18 10:37 Dose:  1 gm


Pantoprazole Sodium (Protonix Ec Tab)  40 mg PO DAILY [facility]


   Last Admin: 11/27/18 10:36 Dose:  40 mg


Repaglinide (Prandin)  1 mg PO TID [facility]


   Last Admin: 11/27/18 10:40 Dose:  1 mg


Rosuvastatin Calcium (Crestor)  20 mg PO HS [facility]


   Last Admin: 11/26/18 23:12 Dose:  20 mg


Sucralfate (Carafate Tab)  1 gm PO QID [facility]


   Last Admin: 11/27/18 10:37 Dose:  1 gm











- Labs


Labs: 


                                        





                                 11/27/18 11:36 





                                 11/27/18 11:36 





                                        











PT  13.0 SECONDS (9.7-12.2)  H  11/25/18  07:26    


 


INR  1.2   11/25/18  07:26    


 


APTT  26 SECONDS (21-34)   11/25/18  07:26    














- Constitutional


Appears: Well





- Head Exam


Head Exam: ATRAUMATIC, NORMAL INSPECTION, NORMOCEPHALIC





- Eye Exam


Eye Exam: EOMI, Normal appearance, PERRL


Pupil Exam: NORMAL ACCOMODATION, PERRL





- ENT Exam


ENT Exam: Mucous Membranes Moist, Normal Exam





- Neck Exam


Neck Exam: Full ROM, Normal Inspection.  absent: Lymphadenopathy





- Respiratory Exam


Respiratory Exam: Decreased Breath Sounds





- Cardiovascular Exam


Cardiovascular Exam: REGULAR RHYTHM, +S1, +S2





- GI/Abdominal Exam


GI & Abdominal Exam: Soft, Diminished Bowel Sounds





- Rectal Exam


Rectal Exam: Deferred

## 2018-11-27 NOTE — CP.PCM.PN
Subjective





- Date & Time of Evaluation


Date of Evaluation: 11/27/18


Time of Evaluation: 10:33





- Subjective


Subjective: 





PT ALERT, FEELS BETTER. LESS COUGH NO SPUTUM.  AMBULATED. ROS;  OTHERWISE NEG. 





Objective





- Vital Signs/Intake and Output


Vital Signs (last 24 hours): 


                                        











Temp Pulse Resp BP Pulse Ox


 


 98.0 F   93 H  20   107/65   96 


 


 11/27/18 07:30  11/27/18 08:05  11/27/18 07:30  11/27/18 07:30  11/27/18 07:30











- Medications


Medications: 


                               Current Medications





Albuterol/Ipratropium (Duoneb 3 Mg/0.5 Mg (3 Ml) Ud)  3 ml INH RQ6 [facility]


   Last Admin: 11/27/18 07:18 Dose:  3 ml


Alprazolam (Xanax)  1 mg PO DAILY PRN


   PRN Reason: Anxiety


   Last Admin: 11/26/18 01:33 Dose:  1 mg


Aspirin (Ecotrin)  81 mg PO DAILY [facility]


   Last Admin: 11/26/18 10:13 Dose:  81 mg


Cyanocobalamin (Vitamin B12 1000 Mcg Tab)  2,000 mcg PO DAILY [facility]


   Last Admin: 11/26/18 10:11 Dose:  2,000 mcg


Dextrose (Dextrose 50% Inj)  0 ml IV STAT PRN; Protocol


   PRN Reason: Hypoglycemia Protocol


Dextrose (Glutose 15)  0 gm PO ONCE PRN; Protocol


   PRN Reason: Hypoglycemia Protocol


Enoxaparin Sodium (Lovenox)  40 mg SC DAILY [facility]


   Last Admin: 11/26/18 10:16 Dose:  40 mg


Ergocalciferol (Drisdol 50,000 Intl Units Cap)  1 cap PO QWK [facility]


   Last Admin: 11/26/18 10:17 Dose:  1 cap


Fenofibrate (Tricor)  48 mg PO DAILY [facility]


   Last Admin: 11/26/18 10:13 Dose:  48 mg


Fluticasone/Vilanterol (Breo Ellipta 100-25 Mcg Inh)  1 puff INH RQD [facility]


   Last Admin: 11/27/18 07:30 Dose:  1 puff


Gabapentin (Neurontin)  800 mg PO TID PRN


   PRN Reason: to give with dilaudid


   Last Admin: 11/26/18 19:31 Dose:  800 mg


Glucagon (Glucagen Diagnostic Kit)  0 mg IM STAT PRN; Protocol


   PRN Reason: Hypoglycemia Protocol


Hydromorphone HCl (Dilaudid)  2 mg PO Q8 PRN


   PRN Reason: pain


   Last Admin: 11/26/18 19:31 Dose:  2 mg


Azithromycin 500 mg/ Sodium (Chloride)  250 mls @ 250 mls/hr IVPB DAILY [facility]; 

Protocol


   Last Admin: 11/26/18 12:22 Dose:  250 mls/hr


Dextrose (Dextrose 5% In Water 1000 Ml)  1,000 mls @ 0 mls/hr IV .Q0M PRN; 

Protocol


   PRN Reason: Hypoglycemia Protocol


Insulin [facility] (Novolog)  [facility]; Protocol


   Last Admin: 11/27/18 08:05 Dose:  10 units


Insulin Detemir (Levemir)  35 unit SC [facility]


   Last Admin: 11/26/18 22:22 Dose:  Not Given


Lactobacillus Acidophilus (Bacid Acidophilus)  1 cap PO BID [facility]


   Last Admin: 11/26/18 17:49 Dose:  1 cap


Levothyroxine Sodium (Synthroid)  100 mcg PO DAILY@0630 [facility]


   Last Admin: 11/27/18 06:37 Dose:  100 mcg


Lidocaine (Lidoderm)  1 ea TD DAILY PRN


   PRN Reason: pain


Methylprednisolone (Solu-Medrol)  40 mg IVP Q8H [facility]


   Stop: 11/27/18 19:31


   Last Admin: 11/27/18 02:50 Dose:  40 mg


Montelukast Sodium (Singulair)  10 mg PO [facility]


   Last Admin: 11/26/18 23:12 Dose:  10 mg


Omega-3-Acid Ethyl Esters (Lovaza)  1 gm PO DAILY [facility]


   Last Admin: 11/26/18 10:14 Dose:  1 gm


Pantoprazole Sodium (Protonix Ec Tab)  40 mg PO DAILY [facility]


   Last Admin: 11/26/18 10:13 Dose:  40 mg


Pneumococcal Polyvalent Vaccine (Pneumovax 23 Vaccine)  0.5 ml IM .ONCE ONE


   Stop: 11/27/18 12:01


Repaglinide (Prandin)  1 mg PO TID [facility]


   Last Admin: 11/26/18 17:49 Dose:  1 mg


Rosuvastatin Calcium (Crestor)  20 mg PO [facility]


   Last Admin: 11/26/18 23:12 Dose:  20 mg


Sucralfate (Carafate Tab)  1 gm PO QID [facility]


   Last Admin: 11/26/18 23:12 Dose:  1 gm











- Labs


Labs: 


                                        





                                 11/24/18 18:56 





                                 11/24/18 18:56 





                                        











PT  13.0 SECONDS (9.7-12.2)  H  11/25/18  07:26    


 


INR  1.2   11/25/18  07:26    


 


APTT  26 SECONDS (21-34)   11/25/18  07:26    














- Constitutional


Appears: No Acute Distress





- Head Exam


Head Exam: ATRAUMATIC, NORMOCEPHALIC





- Eye Exam


Eye Exam: EOMI, Normal appearance





- ENT Exam


ENT Exam: Mucous Membranes Moist





- Neck Exam


Neck Exam: Normal Inspection





- Respiratory Exam


Respiratory Exam: Wheezes


Additional comments: 





BETTER AIR MOVEMENT





- Cardiovascular Exam


Cardiovascular Exam: RRR, +S1, +S2





- GI/Abdominal Exam


GI & Abdominal Exam: Soft.  absent: Tenderness





- Rectal Exam


Rectal Exam: Deferred





- Extremities Exam


Extremities Exam: absent: Calf Tenderness, Pedal Edema





- Back Exam


Back Exam: absent: CVA tenderness (L), CVA tenderness (R)





- Neurological Exam


Neurological Exam: Alert, Awake, CN II-XII Intact, Oriented x3





- Psychiatric Exam


Psychiatric exam: Normal Mood





- Skin


Skin Exam: absent: Rash





Assessment and Plan


(1) COPD exacerbation


Status: Acute   





(2) Diabetes


Status: Acute   





(3) Breast cancer


Status: Acute   





(4) Hyperlipidemia


Status: Acute   





(5) Hypothyroid


Status: Acute   





(6) HTN (hypertension)


Status: Acute   





- Assessment and Plan (Free Text)


Assessment: 





RESP STATUS IMPROVING., LESS BRONCHOSPASM NOW., TAPER IV STEROIDS, CONT NEB BD.,

MONITOR O2 SAT. D/C BIPAP. CXR REVIEWED. DISCUSSED WITH STAFF AT LENGTH.,

## 2018-11-28 LAB
ALBUMIN SERPL-MCNC: 3.8 G/DL (ref 3.5–5)
ALBUMIN/GLOB SERPL: 1.2 {RATIO} (ref 1–2.1)
ALT SERPL-CCNC: 28 U/L (ref 9–52)
ANISOCYTOSIS BLD QL SMEAR: SLIGHT
AST SERPL-CCNC: 25 U/L (ref 14–36)
BASOPHILS # BLD AUTO: 0 K/UL (ref 0–0.2)
BASOPHILS NFR BLD: 0.1 % (ref 0–2)
BUN SERPL-MCNC: 30 MG/DL (ref 7–17)
CALCIUM SERPL-MCNC: 9.4 MG/DL (ref 8.6–10.4)
EOSINOPHIL # BLD AUTO: 0 K/UL (ref 0–0.7)
EOSINOPHIL NFR BLD: 0 % (ref 0–4)
ERYTHROCYTE [DISTWIDTH] IN BLOOD BY AUTOMATED COUNT: 14.5 % (ref 11.5–14.5)
GFR NON-AFRICAN AMERICAN: > 60
HGB BLD-MCNC: 11.3 G/DL (ref 11–16)
HYPOCHROMIC: SLIGHT
LG PLATELETS BLD QL SMEAR: PRESENT
LYMPHOCYTE: 10 % (ref 20–40)
LYMPHOCYTES # BLD AUTO: 1.5 K/UL (ref 1–4.3)
LYMPHOCYTES NFR BLD AUTO: 8.6 % (ref 20–40)
MCH RBC QN AUTO: 31 PG (ref 27–31)
MCHC RBC AUTO-ENTMCNC: 33.3 G/DL (ref 33–37)
MCV RBC AUTO: 92.9 FL (ref 81–99)
MONOCYTE: 3 % (ref 0–10)
MONOCYTES # BLD: 0.6 K/UL (ref 0–0.8)
MONOCYTES NFR BLD: 3.7 % (ref 0–10)
NEUTROPHILS # BLD: 14.8 K/UL (ref 1.8–7)
NEUTROPHILS NFR BLD AUTO: 84 % (ref 50–75)
NEUTROPHILS NFR BLD AUTO: 87.6 % (ref 50–75)
NEUTS BAND NFR BLD: 2 % (ref 0–2)
NRBC BLD AUTO-RTO: 0 % (ref 0–2)
PLATELET # BLD EST: NORMAL 10*3/UL
PLATELET # BLD: 220 K/UL (ref 130–400)
PMV BLD AUTO: 10.1 FL (ref 7.2–11.7)
POIKILOCYTOSIS BLD QL SMEAR: SLIGHT
RBC # BLD AUTO: 3.65 MIL/UL (ref 3.8–5.2)
TOTAL CELLS COUNTED BLD: 100
VARIANT LYMPHS NFR BLD MANUAL: 1 % (ref 0–0)
WBC # BLD AUTO: 16.9 K/UL (ref 4.8–10.8)

## 2018-11-28 RX ADMIN — INSULIN ASPART SCH UNITS: 100 INJECTION, SOLUTION INTRAVENOUS; SUBCUTANEOUS at 17:34

## 2018-11-28 RX ADMIN — PURIFIED WATER SCH LOZ: 99.05 LIQUID OPHTHALMIC at 21:34

## 2018-11-28 RX ADMIN — ENOXAPARIN SODIUM SCH MG: 40 INJECTION SUBCUTANEOUS at 10:50

## 2018-11-28 RX ADMIN — INSULIN ASPART SCH UNITS: 100 INJECTION, SOLUTION INTRAVENOUS; SUBCUTANEOUS at 12:31

## 2018-11-28 RX ADMIN — IPRATROPIUM BROMIDE AND ALBUTEROL SULFATE SCH ML: .5; 3 SOLUTION RESPIRATORY (INHALATION) at 07:40

## 2018-11-28 RX ADMIN — METHYLPREDNISOLONE SODIUM SUCCINATE SCH MG: 40 INJECTION, POWDER, FOR SOLUTION INTRAMUSCULAR; INTRAVENOUS at 11:19

## 2018-11-28 RX ADMIN — INSULIN ASPART SCH UNITS: 100 INJECTION, SOLUTION INTRAVENOUS; SUBCUTANEOUS at 21:37

## 2018-11-28 RX ADMIN — IPRATROPIUM BROMIDE AND ALBUTEROL SULFATE SCH ML: .5; 3 SOLUTION RESPIRATORY (INHALATION) at 13:35

## 2018-11-28 RX ADMIN — METHYLPREDNISOLONE SODIUM SUCCINATE SCH MG: 40 INJECTION, POWDER, FOR SOLUTION INTRAMUSCULAR; INTRAVENOUS at 18:30

## 2018-11-28 RX ADMIN — IPRATROPIUM BROMIDE AND ALBUTEROL SULFATE SCH ML: .5; 3 SOLUTION RESPIRATORY (INHALATION) at 01:10

## 2018-11-28 RX ADMIN — PURIFIED WATER SCH LOZ: 99.05 LIQUID OPHTHALMIC at 18:21

## 2018-11-28 RX ADMIN — INSULIN DETEMIR SCH UNITS: 100 INJECTION, SOLUTION SUBCUTANEOUS at 21:32

## 2018-11-28 RX ADMIN — GUAIFENESIN PRN MG: 100 SOLUTION ORAL at 17:33

## 2018-11-28 RX ADMIN — OMEGA-3-ACID ETHYL ESTERS SCH GM: 900 CAPSULE, LIQUID FILLED ORAL at 12:34

## 2018-11-28 RX ADMIN — FLUTICASONE FUROATE AND VILANTEROL TRIFENATATE SCH PUFF: 100; 25 POWDER RESPIRATORY (INHALATION) at 07:40

## 2018-11-28 RX ADMIN — INSULIN ASPART SCH UNITS: 100 INJECTION, SOLUTION INTRAVENOUS; SUBCUTANEOUS at 08:32

## 2018-11-28 RX ADMIN — Medication SCH CAP: at 18:20

## 2018-11-28 RX ADMIN — PANTOPRAZOLE SODIUM SCH MG: 40 TABLET, DELAYED RELEASE ORAL at 12:33

## 2018-11-28 RX ADMIN — IPRATROPIUM BROMIDE AND ALBUTEROL SULFATE SCH ML: .5; 3 SOLUTION RESPIRATORY (INHALATION) at 20:00

## 2018-11-28 NOTE — CP.PCM.PN
Subjective





- Date & Time of Evaluation


Date of Evaluation: 11/28/18


Time of Evaluation: 10:20





- Subjective


Subjective: 


clinically same





Objective





- Vital Signs/Intake and Output


Vital Signs (last 24 hours): 


                                        











Temp Pulse Resp BP Pulse Ox


 


 98.2 F   104 H  20   122/82   96 


 


 11/28/18 15:00  11/28/18 15:00  11/28/18 15:00  11/28/18 15:00  11/28/18 15:00








Intake and Output: 


                                        











 11/28/18 11/29/18





 18:59 06:59


 


Intake Total 250 


 


Output Total 450 


 


Balance -200 














- Medications


Medications: 


                               Current Medications





Albuterol/Ipratropium (Duoneb 3 Mg/0.5 Mg (3 Ml) Ud)  3 ml INH RQ6 [facility]


   Last Admin: 11/28/18 20:00 Dose:  3 ml


Alprazolam (Xanax)  1 mg PO DAILY PRN


   PRN Reason: Anxiety


   Last Admin: 11/26/18 01:33 Dose:  1 mg


Aspirin (Ecotrin)  81 mg PO DAILY [facility]


   Last Admin: 11/28/18 10:55 Dose:  81 mg


Benzocaine/Menthol (Cepacol Sore Throat)  1 alicia MT QID [facility]


   Last Admin: 11/28/18 21:34 Dose:  1 alicia


Cyanocobalamin (Vitamin B12 1000 Mcg Tab)  2,000 mcg PO DAILY [facility]


   Last Admin: 11/28/18 10:55 Dose:  2,000 mcg


Dextrose (Dextrose 50% Inj)  0 ml IV STAT PRN; Protocol


   PRN Reason: Hypoglycemia Protocol


Dextrose (Glutose 15)  0 gm PO ONCE PRN; Protocol


   PRN Reason: Hypoglycemia Protocol


Enoxaparin Sodium (Lovenox)  40 mg SC DAILY [facility]


   Last Admin: 11/28/18 10:50 Dose:  40 mg


Ergocalciferol (Drisdol 50,000 Intl Units Cap)  1 cap PO QWK [facility]


   Last Admin: 11/26/18 10:17 Dose:  1 cap


Fenofibrate (Tricor)  48 mg PO DAILY [facility]


   Last Admin: 11/28/18 10:55 Dose:  48 mg


Fluticasone/Vilanterol (Breo Ellipta 100-25 Mcg Inh)  1 puff INH RQD [facility]


   Last Admin: 11/28/18 07:40 Dose:  1 puff


Gabapentin (Neurontin)  800 mg PO TID PRN


   PRN Reason: to give with dilaudid


   Last Admin: 11/28/18 21:33 Dose:  800 mg


Glucagon (Glucagen Diagnostic Kit)  0 mg IM STAT PRN; Protocol


   PRN Reason: Hypoglycemia Protocol


Guaifenesin (Robitussin)  200 mg PO Q4H PRN


   PRN Reason: Cough and congestion


   Last Admin: 11/28/18 17:33 Dose:  200 mg


Hydromorphone HCl (Dilaudid)  2 mg PO Q8 PRN


   PRN Reason: pain


   Last Admin: 11/28/18 20:38 Dose:  2 mg


Azithromycin 500 mg/ Sodium (Chloride)  250 mls @ 250 mls/hr IVPB DAILY [facility]; 

Protocol


   Last Admin: 11/28/18 11:00 Dose:  250 mls/hr


Insulin Aspart (Novolog)  0 unit SC ACHS [facility]; Protocol


   Last Admin: 11/28/18 21:37 Dose:  10 units


Insulin Detemir (Levemir)  35 unit SC [facility]


   Last Admin: 11/28/18 21:32 Dose:  35 units


Lactobacillus Acidophilus (Bacid Acidophilus)  1 cap PO BID [facility]


   Last Admin: 11/28/18 18:20 Dose:  1 cap


Levothyroxine Sodium (Synthroid)  100 mcg PO DAILY@0630 [facility]


   Last Admin: 11/28/18 05:58 Dose:  100 mcg


Lidocaine (Lidoderm)  1 ea TD DAILY PRN


   PRN Reason: pain


Methylprednisolone (Solu-Medrol)  30 mg IVP Q8H [facility]


   Last Admin: 11/28/18 18:30 Dose:  30 mg


Montelukast Sodium (Singulair)  10 mg PO HS [facility]


   Last Admin: 11/28/18 21:34 Dose:  10 mg


Omega-3-Acid Ethyl Esters (Lovaza)  1 gm PO DAILY [facility]


   Last Admin: 11/28/18 12:34 Dose:  1 gm


Pantoprazole Sodium (Protonix Ec Tab)  40 mg PO DAILY [facility]


   Last Admin: 11/28/18 12:33 Dose:  40 mg


Repaglinide (Prandin)  1 mg PO TID [facility]


   Last Admin: 11/28/18 18:20 Dose:  1 mg


Rosuvastatin Calcium (Crestor)  20 mg PO HS [facility]


   Last Admin: 11/28/18 21:34 Dose:  20 mg


Sucralfate (Carafate Tab)  1 gm PO QID [facility]


   Last Admin: 11/28/18 21:33 Dose:  1 gm











- Labs


Labs: 


                                        





                                 11/28/18 06:51 





                                 11/28/18 06:51 





                                        











PT  13.0 SECONDS (9.7-12.2)  H  11/25/18  07:26    


 


INR  1.2   11/25/18  07:26    


 


APTT  26 SECONDS (21-34)   11/25/18  07:26    














- Constitutional


Appears: Well





- Head Exam


Head Exam: ATRAUMATIC, NORMAL INSPECTION, NORMOCEPHALIC





- Eye Exam


Eye Exam: EOMI, Normal appearance, PERRL


Pupil Exam: NORMAL ACCOMODATION, PERRL





- ENT Exam


ENT Exam: Mucous Membranes Moist, Normal Exam





- Neck Exam


Neck Exam: Full ROM, Normal Inspection.  absent: Lymphadenopathy





- Respiratory Exam


Respiratory Exam: Decreased Breath Sounds





- Cardiovascular Exam


Cardiovascular Exam: REGULAR RHYTHM, +S1, +S2





- GI/Abdominal Exam


GI & Abdominal Exam: Soft, Diminished Bowel Sounds





- Rectal Exam


Rectal Exam: Deferred

## 2018-11-28 NOTE — RAD
Date of service: 



11/28/2018



HISTORY:

 Shortness of breath 



COMPARISON:

11/24/2018. 



FINDINGS:



LUNGS:

No active pulmonary disease.



PLEURA:

No significant pleural effusion identified, no pneumothorax apparent.



CARDIOVASCULAR:

No atherosclerotic calcification present



Normal.



OSSEOUS STRUCTURES:

No significant abnormalities.



VISUALIZED UPPER ABDOMEN:

Normal.



OTHER FINDINGS:

None.



IMPRESSION:

No active disease. No significant interval change compared to the 

prior examination(s).

## 2018-11-28 NOTE — CP.PCM.PN
Subjective





- Date & Time of Evaluation


Date of Evaluation: 11/28/18


Time of Evaluation: 10:40





- Subjective


Subjective: 


Medicine progress note ( Dr. Arce's service





Patient was seen and examined at bedside, while resting in bed comfortably. As 

per nursing staff and patient, she had a difficult night due to cough. Patient 

reports that she is doing well with significant symptomatic improvement. Patient

denies any symptoms of fever, chills, nausea, vomiting, chest pain, 

palpitations, shortness of breath but admits to non-productive cough. 











Objective





- Vital Signs/Intake and Output


Vital Signs (last 24 hours): 


                                        











Temp Pulse Resp BP Pulse Ox


 


 98.2 F   92 H  18   132/79   96 


 


 11/28/18 07:00  11/28/18 08:10  11/28/18 07:00  11/28/18 07:00  11/28/18 07:00








Intake and Output: 


                                        











 11/28/18 11/28/18





 06:59 18:59


 


Output Total 150 


 


Balance -150 














- Medications


Medications: 


                               Current Medications





Albuterol/Ipratropium (Duoneb 3 Mg/0.5 Mg (3 Ml) Ud)  3 ml INH RQ6 [facility]


   Last Admin: 11/28/18 07:40 Dose:  3 ml


Alprazolam (Xanax)  1 mg PO DAILY PRN


   PRN Reason: Anxiety


   Last Admin: 11/26/18 01:33 Dose:  1 mg


Aspirin (Ecotrin)  81 mg PO DAILY [facility]


   Last Admin: 11/28/18 10:55 Dose:  81 mg


Benzocaine/Menthol (Cepacol Sore Throat)  1 alicia MT QID [facility]


Cyanocobalamin (Vitamin B12 1000 Mcg Tab)  2,000 mcg PO DAILY [facility]


   Last Admin: 11/28/18 10:55 Dose:  2,000 mcg


Dextrose (Dextrose 50% Inj)  0 ml IV STAT PRN; Protocol


   PRN Reason: Hypoglycemia Protocol


Dextrose (Glutose 15)  0 gm PO ONCE PRN; Protocol


   PRN Reason: Hypoglycemia Protocol


Enoxaparin Sodium (Lovenox)  40 mg SC DAILY [facility]


   Last Admin: 11/28/18 10:50 Dose:  40 mg


Ergocalciferol (Drisdol 50,000 Intl Units Cap)  1 cap PO QWK [facility]


   Last Admin: 11/26/18 10:17 Dose:  1 cap


Fenofibrate (Tricor)  48 mg PO DAILY [facility]


   Last Admin: 11/28/18 10:55 Dose:  48 mg


Fluticasone/Vilanterol (Breo Ellipta 100-25 Mcg Inh)  1 puff INH RQD [facility]


   Last Admin: 11/28/18 07:40 Dose:  1 puff


Gabapentin (Neurontin)  800 mg PO TID PRN


   PRN Reason: to give with dilaudid


   Last Admin: 11/28/18 08:50 Dose:  800 mg


Glucagon (Glucagen Diagnostic Kit)  0 mg IM STAT PRN; Protocol


   PRN Reason: Hypoglycemia Protocol


Hydromorphone HCl (Dilaudid)  2 mg PO Q8 PRN


   PRN Reason: pain


   Last Admin: 11/28/18 08:51 Dose:  2 mg


Azithromycin 500 mg/ Sodium (Chloride)  250 mls @ 250 mls/hr IVPB DAILY [facility]; 

Protocol


   Last Admin: 11/28/18 11:00 Dose:  250 mls/hr


Insulin Aspart (Novolog)  0 unit SC ACHS [facility]; Protocol


   Last Admin: 11/28/18 12:31 Dose:  8 units


Insulin Detemir (Levemir)  35 unit SC HS [facility]


   Last Admin: 11/27/18 22:16 Dose:  35 units


Lactobacillus Acidophilus (Bacid Acidophilus)  1 cap PO BID [facility]


   Last Admin: 11/27/18 17:42 Dose:  1 cap


Levothyroxine Sodium (Synthroid)  100 mcg PO DAILY@0630 [facility]


   Last Admin: 11/28/18 05:58 Dose:  100 mcg


Lidocaine (Lidoderm)  1 ea TD DAILY PRN


   PRN Reason: pain


Methylprednisolone (Solu-Medrol)  30 mg IVP Q8H [facility]


   Last Admin: 11/28/18 11:19 Dose:  30 mg


Montelukast Sodium (Singulair)  10 mg PO HS [facility]


   Last Admin: 11/27/18 22:17 Dose:  10 mg


Omega-3-Acid Ethyl Esters (Lovaza)  1 gm PO DAILY [facility]


   Last Admin: 11/28/18 12:34 Dose:  1 gm


Pantoprazole Sodium (Protonix Ec Tab)  40 mg PO DAILY [facility]


   Last Admin: 11/28/18 12:33 Dose:  40 mg


Repaglinide (Prandin)  1 mg PO TID [facility]


   Last Admin: 11/28/18 10:55 Dose:  1 mg


Rosuvastatin Calcium (Crestor)  20 mg PO HS [facility]


   Last Admin: 11/27/18 22:17 Dose:  20 mg


Sucralfate (Carafate Tab)  1 gm PO QID [facility]


   Last Admin: 11/28/18 10:55 Dose:  1 gm











- Labs


Labs: 


                                        





                                 11/28/18 06:51 





                                 11/28/18 06:51 





                                        











PT  13.0 SECONDS (9.7-12.2)  H  11/25/18  07:26    


 


INR  1.2   11/25/18  07:26    


 


APTT  26 SECONDS (21-34)   11/25/18  07:26    














- Constitutional


Appears: Well, No Acute Distress





- Head Exam


Head Exam: ATRAUMATIC, NORMAL INSPECTION





- Eye Exam


Eye Exam: EOMI, Normal appearance





- ENT Exam


ENT Exam: Mucous Membranes Moist





- Respiratory Exam


Respiratory Exam: Clear to Ausculation Bilateral, NORMAL BREATHING PATTERN.  

absent: Wheezes


Additional comments: 


Mild congestion on exam 





- Cardiovascular Exam


Cardiovascular Exam: REGULAR RHYTHM, +S1, +S2





- GI/Abdominal Exam


GI & Abdominal Exam: Soft, Normal Bowel Sounds.  absent: Distended, Firm, 

Guarding, Rigid, Tenderness





- Extremities Exam


Extremities Exam: Normal Inspection.  absent: Calf Tenderness, Pedal Edema





- Neurological Exam


Neurological Exam: Alert, Awake, Oriented x3





- Psychiatric Exam


Psychiatric exam: Normal Affect





Assessment and Plan


(1) COPD exacerbation


Assessment & Plan: 


Consultation:


Pulmonologist, Dr. Parson


* Management as per recommendation 


In combination of COPD and asthma exacerbation 


-BiPap at night


-Solumedrol taper 30mg IV Q8H currently


-Duoneb Q6h [facility], albuterol q4h


-Azithromycin 500mg IV daily 


-Singulair 10mg daily 


-Cepacol Sore throat (1 QID)


-Breo-elipta 1 puff INH daily 


-Robitussin 200mg PO Q4H 





DM; uncontrolled


-increasing RISS/nighttime insulin 2/2 to steroid use


-patient will take same insulin as outpatient with increased meal time dosing as

will be d/c on steroids





Status: Acute   





(2) Anxiety


Assessment & Plan: 


Xanax 1mg daily at night


                  


Status: Acute   





(3) Neuropathy


Assessment & Plan: 


Gabapentin 800mg PO TID 


Status: Acute   





(4) Hypothyroid


Assessment & Plan: 


Synthroid 100mcg PO daily 


Status: Acute   





(5) Hyperlipidemia


Assessment & Plan: 


Tricor 48mg PO daily 


Crestor 20mg PO daily 


Status: Acute   





(6) Diabetes


Assessment & Plan: 


-RISS high


-35 Units Levemir nighttime 


-replanigide 


Status: Acute   





(7) Prophylactic measure


Assessment & Plan: 


-GI prophyalxis not indicated as patient eating


-Lovenox 40mg SC daily 





Disposition: Plans for discharge tomorrow on medrol dose gerardo. As per patient, 

she has all of her home medications and does not need refill upon discharge 





Case discussed with Dr. EDILBERTO Piedra 


Status: Acute

## 2018-11-28 NOTE — CP.PCM.PN
Subjective





- Date & Time of Evaluation


Date of Evaluation: 11/28/18


Time of Evaluation: 10:15





- Subjective


Subjective: 





PT ALERT, ++COUGH LAST NIGHT NO SPUTUM., NOW BETTER.  +DRY THROAT. ROS; 

OTHERWISE NEG. 





Objective





- Vital Signs/Intake and Output


Vital Signs (last 24 hours): 


                                        











Temp Pulse Resp BP Pulse Ox


 


 98.2 F   92 H  18   132/79   96 


 


 11/28/18 07:00  11/28/18 08:10  11/28/18 07:00  11/28/18 07:00  11/28/18 07:00








Intake and Output: 


                                        











 11/28/18 11/28/18





 06:59 18:59


 


Output Total 150 


 


Balance -150 














- Medications


Medications: 


                               Current Medications





Albuterol/Ipratropium (Duoneb 3 Mg/0.5 Mg (3 Ml) Ud)  3 ml INH RQ6 [facility]


   Last Admin: 11/28/18 07:40 Dose:  3 ml


Alprazolam (Xanax)  1 mg PO DAILY PRN


   PRN Reason: Anxiety


   Last Admin: 11/26/18 01:33 Dose:  1 mg


Aspirin (Ecotrin)  81 mg PO DAILY [facility]


   Last Admin: 11/27/18 10:37 Dose:  81 mg


Benzocaine/Menthol (Cepacol Sore Throat)  1 alicia MT QID [facility]


Cyanocobalamin (Vitamin B12 1000 Mcg Tab)  2,000 mcg PO DAILY [facility]


   Last Admin: 11/27/18 10:40 Dose:  2,000 mcg


Dextrose (Dextrose 50% Inj)  0 ml IV STAT PRN; Protocol


   PRN Reason: Hypoglycemia Protocol


Dextrose (Glutose 15)  0 gm PO ONCE PRN; Protocol


   PRN Reason: Hypoglycemia Protocol


Enoxaparin Sodium (Lovenox)  40 mg SC DAILY [facility]


   Last Admin: 11/27/18 10:46 Dose:  40 mg


Ergocalciferol (Drisdol 50,000 Intl Units Cap)  1 cap PO QWK [facility]


   Last Admin: 11/26/18 10:17 Dose:  1 cap


Fenofibrate (Tricor)  48 mg PO DAILY [facility]


   Last Admin: 11/27/18 10:38 Dose:  48 mg


Fluticasone/Vilanterol (Breo Ellipta 100-25 Mcg Inh)  1 puff INH RQD [facility]


   Last Admin: 11/28/18 07:40 Dose:  1 puff


Gabapentin (Neurontin)  800 mg PO TID PRN


   PRN Reason: to give with dilaudid


   Last Admin: 11/28/18 08:50 Dose:  800 mg


Glucagon (Glucagen Diagnostic Kit)  0 mg IM STAT PRN; Protocol


   PRN Reason: Hypoglycemia Protocol


Hydromorphone HCl (Dilaudid)  2 mg PO Q8 PRN


   PRN Reason: pain


   Last Admin: 11/28/18 08:51 Dose:  2 mg


Azithromycin 500 mg/ Sodium (Chloride)  250 mls @ 250 mls/hr IVPB DAILY [facility]; 

Protocol


   Last Admin: 11/27/18 10:47 Dose:  250 mls/hr


Insulin Aspart (Novolog)  0 unit SC ACHS [facility]; Protocol


   Last Admin: 11/28/18 08:32 Dose:  8 units


Insulin Detemir (Levemir)  35 unit SC HS [facility]


   Last Admin: 11/27/18 22:16 Dose:  35 units


Lactobacillus Acidophilus (Bacid Acidophilus)  1 cap PO BID [facility]


   Last Admin: 11/27/18 17:42 Dose:  1 cap


Levothyroxine Sodium (Synthroid)  100 mcg PO DAILY@0630 [facility]


   Last Admin: 11/28/18 05:58 Dose:  100 mcg


Lidocaine (Lidoderm)  1 ea TD DAILY PRN


   PRN Reason: pain


Methylprednisolone (Solu-Medrol)  30 mg IVP Q8H [facility]


Montelukast Sodium (Singulair)  10 mg PO HS [facility]


   Last Admin: 11/27/18 22:17 Dose:  10 mg


Omega-3-Acid Ethyl Esters (Lovaza)  1 gm PO DAILY [facility]


   Last Admin: 11/27/18 10:37 Dose:  1 gm


Pantoprazole Sodium (Protonix Ec Tab)  40 mg PO DAILY [facility]


   Last Admin: 11/27/18 10:36 Dose:  40 mg


Repaglinide (Prandin)  1 mg PO TID [facility]


   Last Admin: 11/27/18 17:42 Dose:  1 mg


Rosuvastatin Calcium (Crestor)  20 mg PO HS [facility]


   Last Admin: 11/27/18 22:17 Dose:  20 mg


Sucralfate (Carafate Tab)  1 gm PO QID [facility]


   Last Admin: 11/27/18 22:17 Dose:  1 gm











- Labs


Labs: 


                                        





                                 11/28/18 06:51 





                                 11/28/18 06:51 





                                        











PT  13.0 SECONDS (9.7-12.2)  H  11/25/18  07:26    


 


INR  1.2   11/25/18  07:26    


 


APTT  26 SECONDS (21-34)   11/25/18  07:26    














- Constitutional


Appears: No Acute Distress





- Head Exam


Head Exam: ATRAUMATIC, NORMOCEPHALIC





- Eye Exam


Eye Exam: EOMI, Normal appearance





- ENT Exam


ENT Exam: Mucous Membranes Dry


Additional comments: 





NO ORAL THRUSH





- Neck Exam


Neck Exam: Normal Inspection





- Respiratory Exam


Respiratory Exam: Wheezes





- Cardiovascular Exam


Cardiovascular Exam: RRR, +S1, +S2





- GI/Abdominal Exam


GI & Abdominal Exam: Soft.  absent: Tenderness





- Rectal Exam


Rectal Exam: Deferred





- Extremities Exam


Extremities Exam: absent: Calf Tenderness, Pedal Edema





- Back Exam


Back Exam: absent: CVA tenderness (L), CVA tenderness (R)





- Neurological Exam


Neurological Exam: Alert, Awake, CN II-XII Intact, Oriented x3





- Psychiatric Exam


Psychiatric exam: Normal Mood





- Skin


Skin Exam: absent: Rash





Assessment and Plan


(1) COPD exacerbation


Status: Acute   





(2) Diabetes


Status: Acute   





(3) Breast cancer


Status: Acute   





(4) Hyperlipidemia


Status: Acute   





(5) Hypothyroid


Status: Acute   





(6) HTN (hypertension)


Status: Acute   





- Assessment and Plan (Free Text)


Assessment: 





RESP STATUS NO SIG CHANGE., CONT STEROID TAPER AS TOLERATED. CONT NEB BD., 

MONITOR O2 SAT. CXR REVIEWED. AFEBRILE ON AB. ADD CEPACOL., NO ORAL THRUSH. 

INCREASE OOB. DISCUSSED WITH STAFF AT LENGTH.

## 2018-11-29 LAB
ALBUMIN SERPL-MCNC: 3.9 G/DL (ref 3.5–5)
ALBUMIN/GLOB SERPL: 1.3 {RATIO} (ref 1–2.1)
ALT SERPL-CCNC: 33 U/L (ref 9–52)
ANISOCYTOSIS BLD QL SMEAR: SLIGHT
AST SERPL-CCNC: 27 U/L (ref 14–36)
BASOPHILS # BLD AUTO: 0 K/UL (ref 0–0.2)
BASOPHILS NFR BLD: 0.1 % (ref 0–2)
BUN SERPL-MCNC: 33 MG/DL (ref 7–17)
CALCIUM SERPL-MCNC: 9 MG/DL (ref 8.6–10.4)
EOSINOPHIL # BLD AUTO: 0 K/UL (ref 0–0.7)
EOSINOPHIL NFR BLD: 0 % (ref 0–4)
ERYTHROCYTE [DISTWIDTH] IN BLOOD BY AUTOMATED COUNT: 14.7 % (ref 11.5–14.5)
GFR NON-AFRICAN AMERICAN: > 60
HGB BLD-MCNC: 11.7 G/DL (ref 11–16)
HYPOCHROMIC: SLIGHT
LYMPHOCYTE: 10 % (ref 20–40)
LYMPHOCYTES # BLD AUTO: 1.2 K/UL (ref 1–4.3)
LYMPHOCYTES NFR BLD AUTO: 9.2 % (ref 20–40)
MCH RBC QN AUTO: 30.5 PG (ref 27–31)
MCHC RBC AUTO-ENTMCNC: 32.8 G/DL (ref 33–37)
MCV RBC AUTO: 93.1 FL (ref 81–99)
MONOCYTE: 2 % (ref 0–10)
MONOCYTES # BLD: 0.4 K/UL (ref 0–0.8)
MONOCYTES NFR BLD: 3.2 % (ref 0–10)
NEUTROPHILS # BLD: 11.7 K/UL (ref 1.8–7)
NEUTROPHILS NFR BLD AUTO: 87 % (ref 50–75)
NEUTROPHILS NFR BLD AUTO: 87.5 % (ref 50–75)
NEUTS BAND NFR BLD: 1 % (ref 0–2)
NRBC BLD AUTO-RTO: 0.1 % (ref 0–2)
PLATELET # BLD EST: NORMAL 10*3/UL
PLATELET # BLD: 230 K/UL (ref 130–400)
PMV BLD AUTO: 10.3 FL (ref 7.2–11.7)
POIKILOCYTOSIS BLD QL SMEAR: SLIGHT
RBC # BLD AUTO: 3.84 MIL/UL (ref 3.8–5.2)
TOTAL CELLS COUNTED BLD: 100
WBC # BLD AUTO: 13.4 K/UL (ref 4.8–10.8)

## 2018-11-29 RX ADMIN — FLUTICASONE FUROATE AND VILANTEROL TRIFENATATE SCH: 100; 25 POWDER RESPIRATORY (INHALATION) at 10:15

## 2018-11-29 RX ADMIN — IPRATROPIUM BROMIDE AND ALBUTEROL SULFATE SCH ML: .5; 3 SOLUTION RESPIRATORY (INHALATION) at 13:41

## 2018-11-29 RX ADMIN — INSULIN ASPART SCH UNITS: 100 INJECTION, SOLUTION INTRAVENOUS; SUBCUTANEOUS at 13:01

## 2018-11-29 RX ADMIN — INSULIN DETEMIR SCH UNITS: 100 INJECTION, SOLUTION SUBCUTANEOUS at 21:49

## 2018-11-29 RX ADMIN — Medication SCH CAP: at 17:52

## 2018-11-29 RX ADMIN — PURIFIED WATER SCH LOZ: 99.05 LIQUID OPHTHALMIC at 13:15

## 2018-11-29 RX ADMIN — Medication SCH CAP: at 10:00

## 2018-11-29 RX ADMIN — GUAIFENESIN PRN MG: 100 SOLUTION ORAL at 10:05

## 2018-11-29 RX ADMIN — METHYLPREDNISOLONE SODIUM SUCCINATE SCH: 40 INJECTION, POWDER, FOR SOLUTION INTRAMUSCULAR; INTRAVENOUS at 20:00

## 2018-11-29 RX ADMIN — METHYLPREDNISOLONE SODIUM SUCCINATE SCH MG: 40 INJECTION, POWDER, FOR SOLUTION INTRAMUSCULAR; INTRAVENOUS at 10:29

## 2018-11-29 RX ADMIN — METHYLPREDNISOLONE SODIUM SUCCINATE SCH MG: 40 INJECTION, POWDER, FOR SOLUTION INTRAMUSCULAR; INTRAVENOUS at 17:59

## 2018-11-29 RX ADMIN — INSULIN ASPART SCH UNITS: 100 INJECTION, SOLUTION INTRAVENOUS; SUBCUTANEOUS at 08:25

## 2018-11-29 RX ADMIN — IPRATROPIUM BROMIDE AND ALBUTEROL SULFATE SCH ML: .5; 3 SOLUTION RESPIRATORY (INHALATION) at 08:22

## 2018-11-29 RX ADMIN — PURIFIED WATER SCH LOZ: 99.05 LIQUID OPHTHALMIC at 10:02

## 2018-11-29 RX ADMIN — OMEGA-3-ACID ETHYL ESTERS SCH GM: 900 CAPSULE, LIQUID FILLED ORAL at 10:00

## 2018-11-29 RX ADMIN — IPRATROPIUM BROMIDE AND ALBUTEROL SULFATE SCH ML: .5; 3 SOLUTION RESPIRATORY (INHALATION) at 01:23

## 2018-11-29 RX ADMIN — IPRATROPIUM BROMIDE AND ALBUTEROL SULFATE SCH ML: .5; 3 SOLUTION RESPIRATORY (INHALATION) at 19:45

## 2018-11-29 RX ADMIN — ENOXAPARIN SODIUM SCH MG: 40 INJECTION SUBCUTANEOUS at 10:05

## 2018-11-29 RX ADMIN — PURIFIED WATER SCH LOZ: 99.05 LIQUID OPHTHALMIC at 17:58

## 2018-11-29 RX ADMIN — PURIFIED WATER SCH LOZ: 99.05 LIQUID OPHTHALMIC at 21:44

## 2018-11-29 RX ADMIN — INSULIN ASPART SCH UNITS: 100 INJECTION, SOLUTION INTRAVENOUS; SUBCUTANEOUS at 21:49

## 2018-11-29 RX ADMIN — INSULIN ASPART SCH UNITS: 100 INJECTION, SOLUTION INTRAVENOUS; SUBCUTANEOUS at 17:30

## 2018-11-29 RX ADMIN — PANTOPRAZOLE SODIUM SCH MG: 40 TABLET, DELAYED RELEASE ORAL at 10:00

## 2018-11-29 RX ADMIN — METHYLPREDNISOLONE SODIUM SUCCINATE SCH MG: 40 INJECTION, POWDER, FOR SOLUTION INTRAMUSCULAR; INTRAVENOUS at 02:34

## 2018-11-29 NOTE — CP.PCM.PN
Subjective





- Date & Time of Evaluation


Date of Evaluation: 11/29/18


Time of Evaluation: 19:28





- Subjective


Subjective: 





PT ALERT, OOB IN CHAIR., FEELS BETTER., LESS COUGH , LESS SOB., AMBULATED.  ROS;

OTHERWISE NEG. 





Objective





- Vital Signs/Intake and Output


Vital Signs (last 24 hours): 


                                        











Temp Pulse Resp BP Pulse Ox


 


 98.1 F   104 H  20   121/77   96 


 


 11/29/18 15:00  11/29/18 15:00  11/29/18 15:00  11/29/18 15:00  11/29/18 15:00











- Medications


Medications: 


                               Current Medications





Albuterol/Ipratropium (Duoneb 3 Mg/0.5 Mg (3 Ml) Ud)  3 ml INH RQ6 [facility]


   Last Admin: 11/29/18 13:41 Dose:  3 ml


Alprazolam (Xanax)  1 mg PO DAILY PRN


   PRN Reason: Anxiety


   Last Admin: 11/26/18 01:33 Dose:  1 mg


Aspirin (Ecotrin)  81 mg PO DAILY [facility]


   Last Admin: 11/29/18 10:00 Dose:  81 mg


Benzocaine/Menthol (Cepacol Sore Throat)  1 alicia MT QID [facility]


   Last Admin: 11/29/18 17:58 Dose:  1 alicia


Cyanocobalamin (Vitamin B12 1000 Mcg Tab)  2,000 mcg PO DAILY [facility]


   Last Admin: 11/29/18 10:01 Dose:  2,000 mcg


Dextrose (Dextrose 50% Inj)  0 ml IV STAT PRN; Protocol


   PRN Reason: Hypoglycemia Protocol


Dextrose (Glutose 15)  0 gm PO ONCE PRN; Protocol


   PRN Reason: Hypoglycemia Protocol


Enoxaparin Sodium (Lovenox)  40 mg SC DAILY [facility]


   Last Admin: 11/29/18 10:05 Dose:  40 mg


Ergocalciferol (Drisdol 50,000 Intl Units Cap)  1 cap PO QWK [facility]


   Last Admin: 11/26/18 10:17 Dose:  1 cap


Fenofibrate (Tricor)  48 mg PO DAILY [facility]


   Last Admin: 11/29/18 10:01 Dose:  48 mg


Fluticasone/Vilanterol (Breo Ellipta 100-25 Mcg Inh)  1 puff INH RQD [facility]


   Last Admin: 11/29/18 10:15 Dose:  Not Given


Gabapentin (Neurontin)  800 mg PO TID PRN


   PRN Reason: to give with dilaudid


   Last Admin: 11/29/18 05:31 Dose:  800 mg


Glucagon (Glucagen Diagnostic Kit)  0 mg IM STAT PRN; Protocol


   PRN Reason: Hypoglycemia Protocol


Guaifenesin (Robitussin)  200 mg PO Q4H PRN


   PRN Reason: Cough and congestion


   Last Admin: 11/29/18 10:05 Dose:  200 mg


Ins[facility] ([facility])  [facility]; Protocol


   Last Admin: 11/29/18 17:30 Dose:  12 units


Insulin Detemir (Levemir)  35 unit SC [facility]


   Last Admin: 11/28/18 21:32 Dose:  35 units


Lactobacillus Acidophilus (Bacid Acidophilus)  1 cap PO BID [facility]


   Last Admin: 11/29/18 17:52 Dose:  1 cap


Levothyroxine Sodium (Synthroid)  100 mcg PO DAILY@0630 [facility]


   Last Admin: 11/29/18 05:31 Dose:  100 mcg


Lidocaine (Lidoderm)  1 ea TD DAILY PRN


   PRN Reason: pain


Methylprednisolone (Solu-Medrol)  30 mg IVP Q8H [facility]


   Last Admin: 11/29/18 17:59 Dose:  30 mg


Montelukast Sodium (Singulair)  10 mg PO HS [facility]


   Last Admin: 11/28/18 21:34 Dose:  10 mg


Moxifloxacin HCl (Avelox)  400 mg PO DAILY [facility]; Protocol


   Last Admin: 11/29/18 17:59 Dose:  400 mg


Omega-3-Acid Ethyl Esters (Lovaza)  1 gm PO DAILY [facility]


   Last Admin: 11/29/18 10:00 Dose:  1 gm


Pantoprazole Sodium (Protonix Ec Tab)  40 mg PO DAILY [facility]


   Last Admin: 11/29/18 10:00 Dose:  40 mg


Repaglinide (Prandin)  1 mg PO TID [facility]


   Last Admin: 11/29/18 17:52 Dose:  1 mg


Rosuvastatin Calcium (Crestor)  20 mg PO HS [facility]


   Last Admin: 11/28/18 21:34 Dose:  20 mg


Sucralfate (Carafate Tab)  1 gm PO QID [facility]


   Last Admin: 11/29/18 17:52 Dose:  1 gm











- Labs


Labs: 


                                        





                                 11/29/18 06:44 





                                 11/29/18 06:44 





                                        











PT  13.0 SECONDS (9.7-12.2)  H  11/25/18  07:26    


 


INR  1.2   11/25/18  07:26    


 


APTT  26 SECONDS (21-34)   11/25/18  07:26    














- Constitutional


Appears: No Acute Distress





- Head Exam


Head Exam: ATRAUMATIC, NORMOCEPHALIC





- Eye Exam


Eye Exam: EOMI, Normal appearance





- ENT Exam


ENT Exam: Mucous Membranes Moist





- Neck Exam


Neck Exam: Normal Inspection





- Respiratory Exam


Respiratory Exam: Decreased Breath Sounds.  absent: Wheezes, Respiratory 

Distress





- Cardiovascular Exam


Cardiovascular Exam: RRR, +S1, +S2





- GI/Abdominal Exam


GI & Abdominal Exam: Soft.  absent: Tenderness





- Rectal Exam


Rectal Exam: Deferred





- Extremities Exam


Extremities Exam: absent: Calf Tenderness, Pedal Edema





- Back Exam


Back Exam: absent: CVA tenderness (L), CVA tenderness (R)





- Neurological Exam


Neurological Exam: Alert, Awake, CN II-XII Intact, Oriented x3





- Psychiatric Exam


Psychiatric exam: Normal Mood





- Skin


Skin Exam: absent: Rash





Assessment and Plan


(1) COPD exacerbation


Status: Acute   





(2) Diabetes


Status: Acute   





(3) Breast cancer


Status: Acute   





(4) Hyperlipidemia


Status: Acute   





(5) Hypothyroid


Status: Acute   





(6) HTN (hypertension)


Status: Acute   





- Assessment and Plan (Free Text)


Assessment: 





RESP STATUS SLOW IMPROVEMENT., CONT STEROID TAPER, ELEVATED GLUCOSE NOTED. CONT 

NEB BD., MONITOR O2 SAT ON ROOM AIR., CXR REVIEWED. AFEBRILE ON AB. DISCUSSED 

WITH STAFF AT LENGTH.

## 2018-11-29 NOTE — CP.PCM.PN
Subjective





- Date & Time of Evaluation


Date of Evaluation: 11/29/18


Time of Evaluation: 08:00





- Subjective


Subjective: 





Medicine progress note: Dr. Claude Piedra's service





Patient was seen and examined at bedside, while resting in bed comfortably.   P

bruno reports that she is doing well with but she continues to have a dry 

cough. Patient denies any symptoms of fever, chills, nausea, vomiting, chest 

pain, palpitations, shortness of breath. 





Objective





- Vital Signs/Intake and Output


Vital Signs (last 24 hours): 


                                        











Temp Pulse Resp BP Pulse Ox


 


 98.6 F   77   18   123/75   96 


 


 11/29/18 07:00  11/29/18 07:00  11/29/18 07:00  11/29/18 07:00  11/29/18 07:00








Intake and Output: 


                                        











 11/29/18 11/29/18





 06:59 18:59


 


Output Total 200 


 


Balance -200 














- Medications


Medications: 


                               Current Medications





Albuterol/Ipratropium (Duoneb 3 Mg/0.5 Mg (3 Ml) Ud)  3 ml INH RQ6 [facility]


   Last Admin: 11/29/18 08:22 Dose:  3 ml


Alprazolam (Xanax)  1 mg PO DAILY PRN


   PRN Reason: Anxiety


   Last Admin: 11/26/18 01:33 Dose:  1 mg


Aspirin (Ecotrin)  81 mg PO DAILY [facility]


   Last Admin: 11/28/18 10:55 Dose:  81 mg


Benzocaine/Menthol (Cepacol Sore Throat)  1 alicia MT QID [facility]


   Last Admin: 11/28/18 21:34 Dose:  1 alicia


Cyanocobalamin (Vitamin B12 1000 Mcg Tab)  2,000 mcg PO DAILY [facility]


   Last Admin: 11/28/18 10:55 Dose:  2,000 mcg


Dextrose (Dextrose 50% Inj)  0 ml IV STAT PRN; Protocol


   PRN Reason: Hypoglycemia Protocol


Dextrose (Glutose 15)  0 gm PO ONCE PRN; Protocol


   PRN Reason: Hypoglycemia Protocol


Enoxaparin Sodium (Lovenox)  40 mg SC DAILY [facility]


   Last Admin: 11/28/18 10:50 Dose:  40 mg


Ergocalciferol (Drisdol 50,000 Intl Units Cap)  1 cap PO QWK [facility]


   Last Admin: 11/26/18 10:17 Dose:  1 cap


Fenofibrate (Tricor)  48 mg PO DAILY [facility]


   Last Admin: 11/28/18 10:55 Dose:  48 mg


Fluticasone/Vilanterol (Breo Ellipta 100-25 Mcg Inh)  1 puff INH RQD [facility]


   Last Admin: 11/28/18 07:40 Dose:  1 puff


Gabapentin (Neurontin)  800 mg PO TID PRN


   PRN Reason: to give with dilaudid


   Last Admin: 11/29/18 05:31 Dose:  800 mg


Glucagon (Glucagen Diagnostic Kit)  0 mg IM STAT PRN; Protocol


   PRN Reason: Hypoglycemia Protocol


Guaifenesin (Robitussin)  200 mg PO Q4H PRN


   PRN Reason: Cough and congestion


   Last Admin: 11/28/18 17:33 Dose:  200 mg


Azithromycin 500 mg/ Sodium (Chloride)  250 mls @ 250 mls/hr IVPB DAILY [facility]; 

Protocol


   Last Admin: 11/28/18 11:00 Dose:  250 mls/hr


Insulin Aspart (Novolog)  0 unit SC [facility]; Protocol


   Last Admin: 11/29/18 08:25 Dose:  10 units


Insulin Detemir (Levemir)  35 unit SC HS [facility]


   Last Admin: 11/28/18 21:32 Dose:  35 units


Lactobacillus Acidophilus (Bacid Acidophilus)  1 cap PO BID [facility]


   Last Admin: 11/28/18 18:20 Dose:  1 cap


Levothyroxine Sodium (Synthroid)  100 mcg PO DAILY@0630 [facility]


   Last Admin: 11/29/18 05:31 Dose:  100 mcg


Lidocaine (Lidoderm)  1 ea TD DAILY PRN


   PRN Reason: pain


Methylprednisolone (Solu-Medrol)  30 mg IVP Q8H [facility]


   Last Admin: 11/29/18 02:34 Dose:  30 mg


Montelukast Sodium (Singulair)  10 mg PO HS [facility]


   Last Admin: 11/28/18 21:34 Dose:  10 mg


Omega-3-Acid Ethyl Esters (Lovaza)  1 gm PO DAILY [facility]


   Last Admin: 11/28/18 12:34 Dose:  1 gm


Pantoprazole Sodium (Protonix Ec Tab)  40 mg PO DAILY [facility]


   Last Admin: 11/28/18 12:33 Dose:  40 mg


Repaglinide (Prandin)  1 mg PO TID [facility]


   Last Admin: 11/28/18 18:20 Dose:  1 mg


Rosuvastatin Calcium (Crestor)  20 mg PO HS [facility]


   Last Admin: 11/28/18 21:34 Dose:  20 mg


Sucralfate (Carafate Tab)  1 gm PO QID [facility]


   Last Admin: 11/28/18 21:33 Dose:  1 gm











- Labs


Labs: 


                                        





                                 11/29/18 06:44 





                                 11/29/18 06:44 





                                        











PT  13.0 SECONDS (9.7-12.2)  H  11/25/18  07:26    


 


INR  1.2   11/25/18  07:26    


 


APTT  26 SECONDS (21-34)   11/25/18  07:26    














- Constitutional


Appears: No Acute Distress





- Head Exam


Head Exam: ATRAUMATIC, NORMAL INSPECTION





- Eye Exam


Eye Exam: EOMI, Normal appearance





- ENT Exam


ENT Exam: Mucous Membranes Moist





- Respiratory Exam


Respiratory Exam: NORMAL BREATHING PATTERN


Additional comments: 





Mild congestion on exam 





- Cardiovascular Exam


Cardiovascular Exam: REGULAR RHYTHM, +S1, +S2





- GI/Abdominal Exam


GI & Abdominal Exam: Soft, Normal Bowel Sounds.  absent: Tenderness





- Extremities Exam


Extremities Exam: Normal Inspection





- Neurological Exam


Neurological Exam: Alert, Awake, Oriented x3





- Psychiatric Exam


Psychiatric exam: Normal Affect





- Skin


Skin Exam: Normal Color





Assessment and Plan





- Assessment and Plan (Free Text)


Assessment: 





(1) COPD exacerbation


Assessment & Plan: 


Consultation:


Pulmonologist, Dr. Parson


* Management as per recommendation 


In combination of COPD and asthma exacerbation 


-BiPap at night


-Solumedrol taper 30mg IV Q8H currently


-Duoneb Q6h NALINI, albuterol q4h


-Azithromycin 500mg IV daily discontinued


- Avelox 400mg daily started 11/29/18


-Singulair 10mg daily 


-Cepacol Sore throat (1 QID)


-Breo-elipta 1 puff INH daily 


-Robitussin 200mg PO Q4H 





DM; uncontrolled


-increasing RISS/nighttime insulin 2/2 to steroid use


-patient will take same insulin as outpatient with increased meal time dosing as

will be d/c on steroids





Status: Acute   





(2) Anxiety


Assessment & Plan: 


Xanax 1mg daily at night


                  


Status: Acute   





(3) Neuropathy


Assessment & Plan: 


Gabapentin 800mg PO TID 


Status: Acute   





(4) Hypothyroid


Assessment & Plan: 


Synthroid 100mcg PO daily 


Status: Acute   





(5) Hyperlipidemia


Assessment & Plan: 


Tricor 48mg PO daily 


Crestor 20mg PO daily 


Status: Acute   





(6) Diabetes


Assessment & Plan: 


-RISS high


-35 Units Levemir nighttime 


-replanigide 


Status: Acute   





(7) Prophylactic measure


Assessment & Plan: 


-GI prophyalxis not indicated as patient eating


-Lovenox 40mg SC daily 





Disposition: Plans for discharge tomorrow on medrol dose gerardo and Avelox. As per 

patient, she has all of her home medications and does not need refill upon 

discharge 





Case discussed with Dr. EDILBERTO Piedra 


Status: Acute

## 2018-11-29 NOTE — CP.PCM.PN
Subjective





- Date & Time of Evaluation


Date of Evaluation: 11/29/18


Time of Evaluation: 09:50





- Subjective


Subjective: 


clinically same





Objective





- Vital Signs/Intake and Output


Vital Signs (last 24 hours): 


                                        











Temp Pulse Resp BP Pulse Ox


 


 98.1 F   104 H  20   121/77   96 


 


 11/29/18 15:00  11/29/18 15:00  11/29/18 15:00  11/29/18 15:00  11/29/18 15:00











- Medications


Medications: 


                               Current Medications





Albuterol/Ipratropium (Duoneb 3 Mg/0.5 Mg (3 Ml) Ud)  3 ml INH RQ6 [facility]


   Last Admin: 11/29/18 19:45 Dose:  3 ml


Alprazolam (Xanax)  1 mg PO DAILY PRN


   PRN Reason: Anxiety


   Last Admin: 11/26/18 01:33 Dose:  1 mg


Aspirin (Ecotrin)  81 mg PO DAILY [facility]


   Last Admin: 11/29/18 10:00 Dose:  81 mg


Benzocaine/Menthol (Cepacol Sore Throat)  1 alicia MT QID [facility]


   Last Admin: 11/29/18 17:58 Dose:  1 alicia


Cyanocobalamin (Vitamin B12 1000 Mcg Tab)  2,000 mcg PO DAILY [facility]


   Last Admin: 11/29/18 10:01 Dose:  2,000 mcg


Dextrose (Dextrose 50% Inj)  0 ml IV STAT PRN; Protocol


   PRN Reason: Hypoglycemia Protocol


Dextrose (Glutose 15)  0 gm PO ONCE PRN; Protocol


   PRN Reason: Hypoglycemia Protocol


Enoxaparin Sodium (Lovenox)  40 mg SC DAILY [facility]


   Last Admin: 11/29/18 10:05 Dose:  40 mg


Ergocalciferol (Drisdol 50,000 Intl Units Cap)  1 cap PO QWK [facility]


   Last Admin: 11/26/18 10:17 Dose:  1 cap


Fenofibrate (Tricor)  48 mg PO DAILY [facility]


   Last Admin: 11/29/18 10:01 Dose:  48 mg


Fluticasone/Vilanterol (Breo Ellipta 100-25 Mcg Inh)  1 puff INH RQD [facility]


   Last Admin: 11/29/18 10:15 Dose:  Not Given


Gabapentin (Neurontin)  800 mg PO TID PRN


   PRN Reason: to give with dilaudid


   Last Admin: 11/29/18 21:39 Dose:  800 mg


Glucagon (Glucagen Diagnostic Kit)  0 mg IM STAT PRN; Protocol


   PRN Reason: Hypoglycemia Protocol


Guaifenesin (Robitussin)  200 mg PO Q4H PRN


   PRN Reason: Cough and congestion


   Last Admin: 11/29/18 10:05 Dose:  200 mg


Hydromorphone HCl (Dilaudid)  2 mg PO Q8 PRN


   PRN Reason: Pain, severe (8-10)


   Last Admin: 11/29/18 21:39 Dose:  2 mg


Ins[facility] Asp[facility] (Nov[facility])  [facility]; Protocol


   Last Admin: 11/29/18 17:30 Dose:  12 units


Insulin Detemir (Levemir)  35 unit SC [facility]


   Last Admin: 11/28/18 21:32 Dose:  35 units


Lactobacillus Acidophilus (Bacid Acidophilus)  1 cap PO BID [facility]


   Last Admin: 11/29/18 17:52 Dose:  1 cap


Levothyroxine Sodium (Synthroid)  100 mcg PO DAILY@0630 [facility]


   Last Admin: 11/29/18 05:31 Dose:  100 mcg


Lidocaine (Lidoderm)  1 ea TD DAILY PRN


   PRN Reason: pain


Methylprednisolone (Solu-Medrol)  20 mg IVP Q12H [facility]


   Last Admin: 11/29/18 20:00 Dose:  Not Given


Montelukast Sodium (Singulair)  10 mg PO [facility]


   Last Admin: 11/28/18 21:34 Dose:  10 mg


Moxifloxacin HCl (Avelox)  400 mg PO DAILY [facility]; Protocol


   Last Admin: 11/29/18 17:59 Dose:  400 mg


Omega-3-Acid Ethyl Esters (Lovaza)  1 gm PO DAILY [facility]


   Last Admin: 11/29/18 10:00 Dose:  1 gm


Pantoprazole Sodium (Protonix Ec Tab)  40 mg PO DAILY [facility]


   Last Admin: 11/29/18 10:00 Dose:  40 mg


Repaglinide (Prandin)  1 mg PO TID [facility]


   Last Admin: 11/29/18 17:52 Dose:  1 mg


Rosuvastatin Calcium (Crestor)  20 mg PO HS [facility]


   Last Admin: 11/28/18 21:34 Dose:  20 mg


Sucralfate (Carafate Tab)  1 gm PO QID [facility]


   Last Admin: 11/29/18 17:52 Dose:  1 gm











- Labs


Labs: 


                                        





                                 11/29/18 06:44 





                                 11/29/18 06:44 





                                        











PT  13.0 SECONDS (9.7-12.2)  H  11/25/18  07:26    


 


INR  1.2   11/25/18  07:26    


 


APTT  26 SECONDS (21-34)   11/25/18  07:26    














- Constitutional


Appears: Well





- Head Exam


Head Exam: ATRAUMATIC, NORMAL INSPECTION, NORMOCEPHALIC





- Eye Exam


Eye Exam: EOMI, Normal appearance, PERRL


Pupil Exam: NORMAL ACCOMODATION, PERRL





- ENT Exam


ENT Exam: Mucous Membranes Moist, Normal Exam





- Neck Exam


Neck Exam: Full ROM, Normal Inspection.  absent: Lymphadenopathy





- Respiratory Exam


Respiratory Exam: Decreased Breath Sounds





- Cardiovascular Exam


Cardiovascular Exam: REGULAR RHYTHM, +S1, +S2





- GI/Abdominal Exam


GI & Abdominal Exam: Soft, Diminished Bowel Sounds





- Rectal Exam


Rectal Exam: Deferred

## 2018-11-30 VITALS
OXYGEN SATURATION: 98 % | TEMPERATURE: 98.2 F | RESPIRATION RATE: 20 BRPM | DIASTOLIC BLOOD PRESSURE: 85 MMHG | HEART RATE: 100 BPM | SYSTOLIC BLOOD PRESSURE: 126 MMHG

## 2018-11-30 RX ADMIN — PURIFIED WATER SCH LOZ: 99.05 LIQUID OPHTHALMIC at 10:05

## 2018-11-30 RX ADMIN — INSULIN ASPART SCH UNITS: 100 INJECTION, SOLUTION INTRAVENOUS; SUBCUTANEOUS at 08:30

## 2018-11-30 RX ADMIN — INSULIN ASPART SCH UNITS: 100 INJECTION, SOLUTION INTRAVENOUS; SUBCUTANEOUS at 12:43

## 2018-11-30 RX ADMIN — ENOXAPARIN SODIUM SCH MG: 40 INJECTION SUBCUTANEOUS at 10:05

## 2018-11-30 RX ADMIN — OMEGA-3-ACID ETHYL ESTERS SCH GM: 900 CAPSULE, LIQUID FILLED ORAL at 10:03

## 2018-11-30 RX ADMIN — IPRATROPIUM BROMIDE AND ALBUTEROL SULFATE SCH ML: .5; 3 SOLUTION RESPIRATORY (INHALATION) at 13:25

## 2018-11-30 RX ADMIN — IPRATROPIUM BROMIDE AND ALBUTEROL SULFATE SCH ML: .5; 3 SOLUTION RESPIRATORY (INHALATION) at 09:03

## 2018-11-30 RX ADMIN — PURIFIED WATER SCH LOZ: 99.05 LIQUID OPHTHALMIC at 13:28

## 2018-11-30 RX ADMIN — METHYLPREDNISOLONE SODIUM SUCCINATE SCH MG: 40 INJECTION, POWDER, FOR SOLUTION INTRAMUSCULAR; INTRAVENOUS at 08:30

## 2018-11-30 RX ADMIN — Medication SCH CAP: at 10:04

## 2018-11-30 RX ADMIN — PANTOPRAZOLE SODIUM SCH MG: 40 TABLET, DELAYED RELEASE ORAL at 10:03

## 2018-11-30 RX ADMIN — IPRATROPIUM BROMIDE AND ALBUTEROL SULFATE SCH ML: .5; 3 SOLUTION RESPIRATORY (INHALATION) at 01:12

## 2018-11-30 RX ADMIN — FLUTICASONE FUROATE AND VILANTEROL TRIFENATATE SCH PUFF: 100; 25 POWDER RESPIRATORY (INHALATION) at 09:03

## 2018-11-30 NOTE — CP.PCM.PN
Subjective





- Date & Time of Evaluation


Date of Evaluation: 11/30/18


Time of Evaluation: 15:12





- Subjective


Subjective: 





PT ALERT, FEELS BETTER., LESS COUGH.  ROS; OTHERWISE NEG. 





Objective





- Vital Signs/Intake and Output


Vital Signs (last 24 hours): 


                                        











Temp Pulse Resp BP Pulse Ox


 


 98.5 F   90   18   138/74   96 


 


 11/30/18 07:00  11/30/18 09:20  11/30/18 07:00  11/30/18 07:00  11/30/18 07:00








Intake and Output: 


                                        











 11/30/18 11/30/18





 06:59 18:59


 


Intake Total 480 


 


Output Total 700 


 


Balance -220 














- Medications


Medications: 


                               Current Medications





Albuterol/Ipratropium (Duoneb 3 Mg/0.5 Mg (3 Ml) Ud)  3 ml INH RQ6 [facility]


   Last Admin: 11/30/18 13:25 Dose:  3 ml


Alprazolam (Xanax)  1 mg PO DAILY PRN


   PRN Reason: Anxiety


   Last Admin: 11/26/18 01:33 Dose:  1 mg


Aspirin (Ecotrin)  81 mg PO DAILY [facility]


   Last Admin: 11/30/18 10:03 Dose:  81 mg


Benzocaine/Menthol (Cepacol Sore Throat)  1 alicia MT QID [facility]


   Last Admin: 11/30/18 13:28 Dose:  1 alicia


Cyanocobalamin (Vitamin B12 1000 Mcg Tab)  2,000 mcg PO DAILY [facility]


   Last Admin: 11/30/18 10:04 Dose:  2,000 mcg


Dextrose (Dextrose 50% Inj)  0 ml IV STAT PRN; Protocol


   PRN Reason: Hypoglycemia Protocol


Dextrose (Glutose 15)  0 gm PO ONCE PRN; Protocol


   PRN Reason: Hypoglycemia Protocol


Enoxaparin Sodium (Lovenox)  40 mg SC DAILY [facility]


   Last Admin: 11/30/18 10:05 Dose:  40 mg


Ergocalciferol (Drisdol 50,000 Intl Units Cap)  1 cap PO QWK [facility]


   Last Admin: 11/26/18 10:17 Dose:  1 cap


Fenofibrate (Tricor)  48 mg PO DAILY [facility]


   Last Admin: 11/30/18 10:04 Dose:  48 mg


Fluticasone/Vilanterol (Breo Ellipta 100-25 Mcg Inh)  1 puff INH RQD [facility]


   Last Admin: 11/30/18 09:03 Dose:  1 puff


Gabapentin (Neurontin)  800 mg PO TID PRN


   PRN Reason: to give with dilaudid


   Last Admin: 11/30/18 05:36 Dose:  800 mg


Glucagon (Glucagen Diagnostic Kit)  0 mg IM STAT PRN; Protocol


   PRN Reason: Hypoglycemia Protocol


Guaifenesin (Robitussin)  200 mg PO Q4H PRN


   PRN Reason: Cough and congestion


   Last Admin: 11/29/18 10:05 Dose:  200 mg


Hydromorphone HCl (Dilaudid)  2 mg PO Q8 PRN


   PRN Reason: Pain, severe (8-10)


   Last Admin: 11/30/18 05:35 Dose:  2 mg


Insulin Aspart (Novolog)  0 unit SC [facility]; Protocol


   Last Admin: 11/30/18 12:43 Dose:  8 units


Insulin Detemir (Levemir)  35 unit SC [facility]


   Last Admin: 11/29/18 21:49 Dose:  35 units


Lactobacillus Acidophilus (Bacid Acidophilus)  1 cap PO BID [facility]


   Last Admin: 11/30/18 10:04 Dose:  1 cap


Levothyroxine Sodium (Synthroid)  100 mcg PO DAILY@0630 [facility]


   Last Admin: 11/30/18 05:34 Dose:  100 mcg


Lidocaine (Lidoderm)  1 ea TD DAILY PRN


   PRN Reason: pain


Methylprednisolone (Solu-Medrol)  20 mg IVP Q12H [facility]


   Last Admin: 11/30/18 08:30 Dose:  20 mg


Montelukast Sodium (Singulair)  10 mg PO [facility]


   Last Admin: 11/29/18 22:15 Dose:  10 mg


Moxifloxacin HCl (Avelox)  400 mg PO DAILY [facility]; Protocol


   Last Admin: 11/30/18 10:03 Dose:  400 mg


Omega-3-Acid Ethyl Esters (Lovaza)  1 gm PO DAILY [facility]


   Last Admin: 11/30/18 10:03 Dose:  1 gm


Pantoprazole Sodium (Protonix Ec Tab)  40 mg PO DAILY [facility]


   Last Admin: 11/30/18 10:03 Dose:  40 mg


Repaglinide (Prandin)  1 mg PO TID [facility]


   Last Admin: 11/30/18 13:28 Dose:  1 mg


Rosuvastatin Calcium (Crestor)  20 mg PO [facility]


   Last Admin: 11/29/18 22:15 Dose:  20 mg


Sucralfate (Carafate Tab)  1 gm PO QID [facility]


   Last Admin: 11/30/18 13:27 Dose:  1 gm











- Labs


Labs: 


                                        





                                 11/29/18 06:44 





                                 11/29/18 06:44 





                                        











PT  13.0 SECONDS (9.7-12.2)  H  11/25/18  07:26    


 


INR  1.2   11/25/18  07:26    


 


APTT  26 SECONDS (21-34)   11/25/18  07:26    














- Constitutional


Appears: No Acute Distress





- Head Exam


Head Exam: ATRAUMATIC, NORMOCEPHALIC





- Eye Exam


Eye Exam: EOMI, Normal appearance





- ENT Exam


ENT Exam: Mucous Membranes Moist





- Neck Exam


Neck Exam: Normal Inspection





- Respiratory Exam


Respiratory Exam: Decreased Breath Sounds.  absent: Wheezes, Respiratory 

Distress





- Cardiovascular Exam


Cardiovascular Exam: RRR, +S1, +S2





- GI/Abdominal Exam


GI & Abdominal Exam: Soft





- Rectal Exam


Rectal Exam: Deferred





- Extremities Exam


Extremities Exam: absent: Calf Tenderness, Pedal Edema





- Back Exam


Back Exam: absent: CVA tenderness (L), CVA tenderness (R)





- Neurological Exam


Neurological Exam: Alert, Awake, CN II-XII Intact, Oriented x3





- Psychiatric Exam


Psychiatric exam: Normal Mood





- Skin


Skin Exam: absent: Rash





Assessment and Plan


(1) COPD exacerbation


Status: Acute   





(2) Diabetes


Status: Acute   





(3) Breast cancer


Status: Acute   





(4) Hyperlipidemia


Status: Acute   





(5) Hypothyroid


Status: Acute   





(6) HTN (hypertension)


Status: Acute   





- Assessment and Plan (Free Text)


Assessment: 





RESP STATUS IMPROVING, ON STEROID TAPER, TO CHANGE PO PRED. CONT NEB BD., CXR 

REVIEWED. CHECK O2 SAT ON ROOM AIR. DISCUSSED WITH STAFF.

## 2018-11-30 NOTE — CP.PCM.PN
Subjective





- Date & Time of Evaluation


Date of Evaluation: 11/30/18


Time of Evaluation: 09:15





- Subjective


Subjective: 


Medicine progress note: Dr. Claude Piedra's service





Patient was seen and examined at bedside, while resting in bed comfortably.   Cheko darling reports that she is doing well with but with very mild non-productive 

cough.  Patient denies any symptoms of fever, chills, nausea, vomiting, chest 

pain, palpitations, shortness of breath. Patient is with improve symptoms. 








Objective





- Vital Signs/Intake and Output


Vital Signs (last 24 hours): 


                                        











Temp Pulse Resp BP Pulse Ox


 


 98.5 F   90   18   138/74   96 


 


 11/30/18 07:00  11/30/18 09:20  11/30/18 07:00  11/30/18 07:00  11/30/18 07:00








Intake and Output: 


                                        











 11/30/18 11/30/18





 06:59 18:59


 


Intake Total 480 


 


Output Total 700 


 


Balance -220 














- Medications


Medications: 


                               Current Medications





Albuterol/Ipratropium (Duoneb 3 Mg/0.5 Mg (3 Ml) Ud)  3 ml INH RQ6 [facility]


   Last Admin: 11/30/18 09:03 Dose:  3 ml


Alprazolam (Xanax)  1 mg PO DAILY PRN


   PRN Reason: Anxiety


   Last Admin: 11/26/18 01:33 Dose:  1 mg


Aspirin (Ecotrin)  81 mg PO DAILY [facility]


   Last Admin: 11/30/18 10:03 Dose:  81 mg


Benzocaine/Menthol (Cepacol Sore Throat)  1 alicia MT QID [facility]


   Last Admin: 11/30/18 13:28 Dose:  1 alicia


Cyanocobalamin (Vitamin B12 1000 Mcg Tab)  2,000 mcg PO DAILY [facility]


   Last Admin: 11/30/18 10:04 Dose:  2,000 mcg


Dextrose (Dextrose 50% Inj)  0 ml IV STAT PRN; Protocol


   PRN Reason: Hypoglycemia Protocol


Dextrose (Glutose 15)  0 gm PO ONCE PRN; Protocol


   PRN Reason: Hypoglycemia Protocol


Enoxaparin Sodium (Lovenox)  40 mg SC DAILY [facility]


   Last Admin: 11/30/18 10:05 Dose:  40 mg


Ergocalciferol (Drisdol 50,000 Intl Units Cap)  1 cap PO QWK [facility]


   Last Admin: 11/26/18 10:17 Dose:  1 cap


Fenofibrate (Tricor)  48 mg PO DAILY [facility]


   Last Admin: 11/30/18 10:04 Dose:  48 mg


Fluticasone/Vilanterol (Breo Ellipta 100-25 Mcg Inh)  1 puff INH RQD [facility]


   Last Admin: 11/30/18 09:03 Dose:  1 puff


Gabapentin (Neurontin)  800 mg PO TID PRN


   PRN Reason: to give with dilaudid


   Last Admin: 11/30/18 05:36 Dose:  800 mg


Glucagon (Glucagen Diagnostic Kit)  0 mg IM STAT PRN; Protocol


   PRN Reason: Hypoglycemia Protocol


Guaifenesin (Robitussin)  200 mg PO Q4H PRN


   PRN Reason: Cough and congestion


   Last Admin: 11/29/18 10:05 Dose:  200 mg


Hydromorphone HCl (Dilaudid)  2 mg PO Q8 PRN


   PRN Reason: Pain, severe (8-10)


   Last Admin: 11/30/18 05:35 Dose:  2 mg


Insulin Aspart (Novolog)  0 unit SC ACHS [facility]; Protocol


   Last Admin: 11/30/18 12:43 Dose:  8 units


Insulin Detemir (Levemir)  35 unit SC [facility]


   Last Admin: 11/29/18 21:49 Dose:  35 units


Lactobacillus Acidophilus (Bacid Acidophilus)  1 cap PO BID [facility]


   Last Admin: 11/30/18 10:04 Dose:  1 cap


Levothyroxine Sodium (Synthroid)  100 mcg PO DAILY@0630 [facility]


   Last Admin: 11/30/18 05:34 Dose:  100 mcg


Lidocaine (Lidoderm)  1 ea TD DAILY PRN


   PRN Reason: pain


Methylprednisolone (Solu-Medrol)  20 mg IVP Q12H [facility]


   Last Admin: 11/30/18 08:30 Dose:  20 mg


Montelukast Sodium (Singulair)  10 mg PO HS [facility]


   Last Admin: 11/29/18 22:15 Dose:  10 mg


Moxifloxacin HCl (Avelox)  400 mg PO DAILY [facility]; Protocol


   Last Admin: 11/30/18 10:03 Dose:  400 mg


Omega-3-Acid Ethyl Esters (Lovaza)  1 gm PO DAILY [facility]


   Last Admin: 11/30/18 10:03 Dose:  1 gm


Pantoprazole Sodium (Protonix Ec Tab)  40 mg PO DAILY [facility]


   Last Admin: 11/30/18 10:03 Dose:  40 mg


Repaglinide (Prandin)  1 mg PO TID [facility]


   Last Admin: 11/30/18 13:28 Dose:  1 mg


Rosuvastatin Calcium (Crestor)  20 mg PO HS [facility]


   Last Admin: 11/29/18 22:15 Dose:  20 mg


Sucralfate (Carafate Tab)  1 gm PO QID [facility]


   Last Admin: 11/30/18 13:27 Dose:  1 gm











- Labs


Labs: 


                                        





                                 11/29/18 06:44 





                                 11/29/18 06:44 





                                        











PT  13.0 SECONDS (9.7-12.2)  H  11/25/18  07:26    


 


INR  1.2   11/25/18  07:26    


 


APTT  26 SECONDS (21-34)   11/25/18  07:26    














- Constitutional


Appears: No Acute Distress





- Head Exam


Head Exam: ATRAUMATIC, NORMAL INSPECTION





- Eye Exam


Eye Exam: EOMI, Normal appearance





- ENT Exam


ENT Exam: Mucous Membranes Moist





- Respiratory Exam


Respiratory Exam: Clear to Ausculation Bilateral, NORMAL BREATHING PATTERN.  

absent: Chest Wall Tenderness, Decreased Breath Sounds, Prolonged Expiratory 

Phase, Rhonchi, Wheezes





- Cardiovascular Exam


Cardiovascular Exam: REGULAR RHYTHM, +S1, +S2





- GI/Abdominal Exam


GI & Abdominal Exam: Soft, Normal Bowel Sounds.  absent: Distended, Firm, 

Guarding, Rigid, Tenderness





- Extremities Exam


Extremities Exam: Normal Inspection.  absent: Calf Tenderness





- Neurological Exam


Neurological Exam: Alert, Awake, Oriented x3





- Psychiatric Exam


Psychiatric exam: Normal Affect





- Skin


Skin Exam: Normal Color





Assessment and Plan


(1) COPD exacerbation


Assessment & Plan: 


Consultation:


Pulmonologist, Dr. Parson


* Management as per recommendation 


In combination of COPD and asthma exacerbation 


-BiPap at night


-Solumedrol taper 30mg IV Q8H currently


-Duoneb Q6h [facility], albuterol q4h


-Azithromycin 500mg IV daily discontinued


- Avelox 400mg daily started 11/29/18


-Singulair 10mg daily 


-Cepacol Sore throat (1 QID)


-Breo-elipta 1 puff INH daily 


-Robitussin 200mg PO Q4H 


Chest X-ray: No active disease


Chest CT: No active pathology 





DM; uncontrolled


-increasing RISS/nighttime insulin 2/2 to steroid use


-patient will take same insulin as outpatient with increased meal time dosing as

will be d/c on steroids





Status: Acute   





(2) Anxiety


Assessment & Plan: 


Xanax 1mg daily at night


Status: Acute   





(3) Neuropathy


Assessment & Plan: 


Gabapentin 800mg PO TID


Status: Acute   





(4) Hypothyroid


Assessment & Plan: 


Synthroid 100mcg PO daily


Status: Acute   





(5) Hyperlipidemia


Assessment & Plan: 


Tricor 48mg PO daily 


Crestor 20mg PO daily 


Status: Acute   





(6) Diabetes


Assessment & Plan: 


-RISS high


-35 Units Levemir nighttime 


-replanigide 


Status: Acute   





(7) Prophylactic measure


Assessment & Plan: 


-GI prophyalxis not indicated as patient eating


-Lovenox 40mg SC daily 





Disposition: 


Please discharge patient home as per Dr. Oliver Piedra


Please resume all your home medications 


Please take Avelox 500mg PO daily, please take 1 tablet once daily for 5 days. 

Please take with Yogurt and probiotics. 


Please take prednisone 40mg PO daily for 5 days. Please take in the morning time


Please take tessalon perles 100mg PO TID PRN 


Please follow with your primary care physician or Dr. EUSEBIO Piedra in 1-3 days 


Please obtain pulmonologist referral from your PCP or follow up with Dr. Parson 


Please use your inhaler as needed 


Please try to avoid any exacerbating factors for your asthma 


Please take care 





All plans and management discussed with Dr. Oliver Piedra 


Status: Acute

## 2018-12-06 NOTE — PQF
PROVIDER RESPONSE TEXT:



Bacterial Pneumonia along with asthma exacerbation.



REVIEWER QUERY TEXT:



Pneumonia Specificity



Pneumonia is documented in the Medical Record. Please specify the type of pneumonia and the causative
 organism (includes probable or suspected)

Such as:

Type:

-- Aspiration pneumonia (please also specify the aspirate)

- [place] (please specify cause)

- Please indicate if the aspiration is postprocedure

-- Bacterial (please document suspected or probable organism)

-- Bronchopneumonia (please document suspected or probable organism)

-- Interstitial pneumonia

-- Organizing pneumonia / BOOP

-- Pneumonia with influenza, ariela flu, or H1N1 flu

-- RSV

-- Tuberculosis, pulmonary

-- Viral

-- Other, please specify



The patient's Clinical Indicators include:

ASTHMA/BRONCHITIS/COPD==EXACERBATION



'PLEASE CLARIFY AND DOCUMENT THE 'PNEUMONIA' diagnosis.





Query created by: Kasia Chow on 2018 9:12 AM





Electronically signed by:  Oliver GUZMAN 2018 2:07 PM

## 2019-02-26 ENCOUNTER — HOSPITAL ENCOUNTER (OUTPATIENT)
Dept: HOSPITAL 31 - C.ENDO | Age: 69
Discharge: HOME | End: 2019-02-26
Attending: SPECIALIST
Payer: MEDICARE

## 2019-02-26 VITALS — RESPIRATION RATE: 18 BRPM

## 2019-02-26 VITALS — OXYGEN SATURATION: 100 % | HEART RATE: 72 BPM | DIASTOLIC BLOOD PRESSURE: 63 MMHG | SYSTOLIC BLOOD PRESSURE: 134 MMHG

## 2019-02-26 VITALS — BODY MASS INDEX: 31.8 KG/M2

## 2019-02-26 VITALS — TEMPERATURE: 97 F

## 2019-02-26 DIAGNOSIS — K29.70: ICD-10-CM

## 2019-02-26 DIAGNOSIS — K30: Primary | ICD-10-CM

## 2019-02-26 DIAGNOSIS — J45.909: ICD-10-CM

## 2019-02-26 DIAGNOSIS — B37.9: ICD-10-CM

## 2019-02-26 DIAGNOSIS — K44.9: ICD-10-CM

## 2019-02-26 PROCEDURE — 88305 TISSUE EXAM BY PATHOLOGIST: CPT

## 2019-02-26 PROCEDURE — 43239 EGD BIOPSY SINGLE/MULTIPLE: CPT

## 2019-02-26 PROCEDURE — 82948 REAGENT STRIP/BLOOD GLUCOSE: CPT

## 2019-02-26 PROCEDURE — 88342 IMHCHEM/IMCYTCHM 1ST ANTB: CPT

## 2019-02-26 NOTE — CP.SDSHP
Same Day Surgery H & P





- History


Proposed Procedure: egd


Pre-Op Diagnosis: SEE NOTES





- Previous Medical/Surgical History


Pulmonary: Asthma


Endocrine/Metabolic: Thyroid Disease, Diabetes


Neuro: Other


Misc: Other


Pain: 4.Moderate Pain





- Allergies


Allergies: 


Allergies





cefazolin Allergy (Mild, Verified 11/24/18 16:54)


   ITCHING


ibuprofen Allergy (Mild, Verified 11/24/18 16:54)


   ITCHING


pineapple Allergy (Mild, Verified 11/24/18 16:54)


   URTICARIA


zolpidem [From Ambien] Allergy (Verified 11/24/18 16:54)


   DIZZINESS


'RELAXER MEDS" Allergy (Uncoded 11/24/18 16:54)


   ITCHING











- Physical Exam


General Appearance: N


Vital Signs: 


                                   Vital Signs











  02/26/19





  09:33


 


Temperature 98 F


 


Pulse Rate 78


 


Respiratory 20





Rate 


 


Blood Pressure 133/79


 


O2 Sat by Pulse 100





Oximetry 











Mental Status: Alert & Oriented x3


Neuro: WNL


Heart: Other


Lungs: Other


GI: Other





- {Optional Preform as Required}


Breast: WNL


Abdomen: Other


Rectal: Other


Integument: WNL


: WNL


Ortho: Other


ENT: WNL





- Impression


Pt. Evaluated Today:Candidate for Anesthesia & Procedure: Yes





- Date & Time


Time: 10:22





Short Stay Discharge





- Short Stay Discharge


Admitting Diagnosis/Reason for Visit: DYSPEPSIA


Disposition: HOME/ ROUTINE

## 2020-12-06 NOTE — RAD
Date of service: 



11/24/2018



HISTORY:

SOB



COMPARISON:

Comparison made with chest radiograph 01/09/2014.  This study was 

also read in conjunction with subsequent CTA of the chest 11/24/2018 

at 22:27 hours. 



FINDINGS:



LUNGS:

Mild right basilar atelectasis.  Note however that a portion of the 

right lateral lower hemithorax is obscured by in situ right-sided 

breast prosthesis..  Minimal left basilar atelectasis or scarring. 



PLEURA:

No significant pleural effusion identified, no pneumothorax apparent.



CARDIOVASCULAR:

No obvious aortic atherosclerotic calcification present.



Heart is mildly enlarged..  No pulmonary vascular congestion. 



OSSEOUS STRUCTURES:

No significant abnormalities.



VISUALIZED UPPER ABDOMEN:

Normal.



OTHER FINDINGS:

Metallic clips left axilla and left mastectomy changes again noted. 



IMPRESSION:

Mild right basilar atelectasis.  Note however that a portion of the 

right lateral lower hemithorax is obscured by in situ right-sided 

breast prosthesis..  Minimal left basilar atelectasis or scarring.



See above discussion for additional details and findings. [facility]  [facility]  [address] [place] [address]  Phone: [phone]  Fax:   Follow Up Time: 1-3 Days    [facility] ([facility])  Rehabilitation  [facility] [address] [place] [address]  Phone: [phone]  Fax:   Follow Up Time: 1-3 Days